# Patient Record
Sex: FEMALE | Race: WHITE | NOT HISPANIC OR LATINO | Employment: UNEMPLOYED | URBAN - NONMETROPOLITAN AREA
[De-identification: names, ages, dates, MRNs, and addresses within clinical notes are randomized per-mention and may not be internally consistent; named-entity substitution may affect disease eponyms.]

---
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
## 2021-01-19 ENCOUNTER — TELEPHONE (OUTPATIENT)
Dept: OBSTETRICS AND GYNECOLOGY | Facility: CLINIC | Age: 21
End: 2021-01-19

## 2021-01-19 NOTE — TELEPHONE ENCOUNTER
I tried to call patient to remind her of her appointment tomorrow. She did not answer. I could not leave a message.

## 2021-01-20 ENCOUNTER — INITIAL PRENATAL (OUTPATIENT)
Dept: OBSTETRICS AND GYNECOLOGY | Facility: CLINIC | Age: 21
End: 2021-01-20

## 2021-01-20 ENCOUNTER — LAB (OUTPATIENT)
Dept: LAB | Facility: HOSPITAL | Age: 21
End: 2021-01-20

## 2021-01-20 VITALS — SYSTOLIC BLOOD PRESSURE: 112 MMHG | WEIGHT: 216 LBS | BODY MASS INDEX: 34.86 KG/M2 | DIASTOLIC BLOOD PRESSURE: 62 MMHG

## 2021-01-20 DIAGNOSIS — Z34.91 PREGNANCY WITH UNCERTAIN DATE OF LAST MENSTRUAL PERIOD IN FIRST TRIMESTER, ANTEPARTUM: Primary | ICD-10-CM

## 2021-01-20 DIAGNOSIS — Z98.891 HISTORY OF C-SECTION: ICD-10-CM

## 2021-01-20 DIAGNOSIS — Z32.00 PREGNANCY EXAMINATION OR TEST, PREGNANCY UNCONFIRMED: ICD-10-CM

## 2021-01-20 DIAGNOSIS — Z34.80 SUPERVISION OF OTHER NORMAL PREGNANCY: Primary | ICD-10-CM

## 2021-01-20 DIAGNOSIS — E66.9 OBESITY (BMI 30-39.9): ICD-10-CM

## 2021-01-20 DIAGNOSIS — F12.91 HISTORY OF MARIJUANA USE: ICD-10-CM

## 2021-01-20 LAB
ABO GROUP BLD: NORMAL
AMPHET+METHAMPHET UR QL: NEGATIVE
AMPHETAMINES UR QL: NEGATIVE
B-HCG UR QL: POSITIVE
BARBITURATES UR QL SCN: NEGATIVE
BASOPHILS # BLD AUTO: 0.05 10*3/MM3 (ref 0–0.2)
BASOPHILS NFR BLD AUTO: 0.5 % (ref 0–1.5)
BENZODIAZ UR QL SCN: NEGATIVE
BILIRUB UR QL STRIP: NEGATIVE
BLD GP AB SCN SERPL QL: NEGATIVE
BUPRENORPHINE SERPL-MCNC: NEGATIVE NG/ML
CANNABINOIDS SERPL QL: NEGATIVE
CLARITY UR: CLEAR
COCAINE UR QL: NEGATIVE
COLOR UR: YELLOW
DEPRECATED RDW RBC AUTO: 44.5 FL (ref 37–54)
EOSINOPHIL # BLD AUTO: 0.32 10*3/MM3 (ref 0–0.4)
EOSINOPHIL NFR BLD AUTO: 3.1 % (ref 0.3–6.2)
ERYTHROCYTE [DISTWIDTH] IN BLOOD BY AUTOMATED COUNT: 14.2 % (ref 12.3–15.4)
GLUCOSE UR STRIP-MCNC: NEGATIVE MG/DL
HBV SURFACE AG SERPL QL IA: NORMAL
HCT VFR BLD AUTO: 35.3 % (ref 34–46.6)
HCV AB SER DONR QL: NORMAL
HGB BLD-MCNC: 11.6 G/DL (ref 12–15.9)
HGB UR QL STRIP.AUTO: NEGATIVE
HIV1+2 AB SER QL: NORMAL
IMM GRANULOCYTES # BLD AUTO: 0.04 10*3/MM3 (ref 0–0.05)
IMM GRANULOCYTES NFR BLD AUTO: 0.4 % (ref 0–0.5)
INTERNAL NEGATIVE CONTROL: NEGATIVE
INTERNAL POSITIVE CONTROL: POSITIVE
KETONES UR QL STRIP: NEGATIVE
LEUKOCYTE ESTERASE UR QL STRIP.AUTO: ABNORMAL
LYMPHOCYTES # BLD AUTO: 2.16 10*3/MM3 (ref 0.7–3.1)
LYMPHOCYTES NFR BLD AUTO: 20.8 % (ref 19.6–45.3)
Lab: ABNORMAL
Lab: NORMAL
MCH RBC QN AUTO: 28.4 PG (ref 26.6–33)
MCHC RBC AUTO-ENTMCNC: 32.9 G/DL (ref 31.5–35.7)
MCV RBC AUTO: 86.5 FL (ref 79–97)
METHADONE UR QL SCN: NEGATIVE
MONOCYTES # BLD AUTO: 0.83 10*3/MM3 (ref 0.1–0.9)
MONOCYTES NFR BLD AUTO: 8 % (ref 5–12)
NEUTROPHILS NFR BLD AUTO: 6.98 10*3/MM3 (ref 1.7–7)
NEUTROPHILS NFR BLD AUTO: 67.2 % (ref 42.7–76)
NITRITE UR QL STRIP: NEGATIVE
NRBC BLD AUTO-RTO: 0 /100 WBC (ref 0–0.2)
OPIATES UR QL: NEGATIVE
OXYCODONE UR QL SCN: NEGATIVE
PCP UR QL SCN: NEGATIVE
PH UR STRIP.AUTO: 6 [PH] (ref 5–8)
PLATELET # BLD AUTO: 310 10*3/MM3 (ref 140–450)
PMV BLD AUTO: 11.4 FL (ref 6–12)
PROPOXYPH UR QL: NEGATIVE
PROT UR QL STRIP: NEGATIVE
RBC # BLD AUTO: 4.08 10*6/MM3 (ref 3.77–5.28)
RH BLD: POSITIVE
RPR SER QL: NORMAL
RUBV IGG SERPL IA-ACNC: POSITIVE
SP GR UR STRIP: 1.02 (ref 1–1.03)
TRICYCLICS UR QL SCN: NEGATIVE
UROBILINOGEN UR QL STRIP: ABNORMAL
WBC # BLD AUTO: 10.38 10*3/MM3 (ref 3.4–10.8)

## 2021-01-20 PROCEDURE — 80081 OBSTETRIC PANEL INC HIV TSTG: CPT | Performed by: OBSTETRICS & GYNECOLOGY

## 2021-01-20 PROCEDURE — 86803 HEPATITIS C AB TEST: CPT | Performed by: OBSTETRICS & GYNECOLOGY

## 2021-01-20 PROCEDURE — 36415 COLL VENOUS BLD VENIPUNCTURE: CPT

## 2021-01-20 PROCEDURE — 80306 DRUG TEST PRSMV INSTRMNT: CPT | Performed by: OBSTETRICS & GYNECOLOGY

## 2021-01-20 PROCEDURE — 81025 URINE PREGNANCY TEST: CPT | Performed by: OBSTETRICS & GYNECOLOGY

## 2021-01-20 PROCEDURE — 87491 CHLMYD TRACH DNA AMP PROBE: CPT | Performed by: OBSTETRICS & GYNECOLOGY

## 2021-01-20 PROCEDURE — 81003 URINALYSIS AUTO W/O SCOPE: CPT | Performed by: OBSTETRICS & GYNECOLOGY

## 2021-01-20 PROCEDURE — 99214 OFFICE O/P EST MOD 30 MIN: CPT | Performed by: OBSTETRICS & GYNECOLOGY

## 2021-01-20 PROCEDURE — 87086 URINE CULTURE/COLONY COUNT: CPT | Performed by: OBSTETRICS & GYNECOLOGY

## 2021-01-20 PROCEDURE — 87591 N.GONORRHOEAE DNA AMP PROB: CPT | Performed by: OBSTETRICS & GYNECOLOGY

## 2021-01-20 PROCEDURE — 87661 TRICHOMONAS VAGINALIS AMPLIF: CPT | Performed by: OBSTETRICS & GYNECOLOGY

## 2021-01-20 NOTE — PROGRESS NOTES
I spent approximately 45 minutes with the patient acquiring the health and history intake and discussing topics related to healthy lifestyle. Her LMP is approximately 10/31/20.  This is her 2nd pregnancy. She had a csection with her son. She was induced because of polyhydramnios and fetal macrosomia. She had fever and chorioamnionitis, so a csection was done. Then, she had a postpartum hemorrhage. She would like to  if possible. A pamphlet for  is given to the patient and she will discuss if she is able to have a  with the provider. A newob bag is given today. The 1st trimester teaching was done with the patient. We discussed a healthy diet and exercise and what is recommended. I also discussed Listeriosis and Toxoplasmosis and what fish to avoid due to high mercury levels. I informed patient not to be in hot tubs, saunas, or tanning beds. We discussed that spotting may occur after intercourse which is common, but if heavy bleeding like a period occurs to call [facility] or hospital if clinic is closed.  I encouraged her to make an appointment with the dentist if she has not had a dental exam and cleaning in the last 6 months. She admits she has not seen the dentist in about 2 years.  I instructed the patient that alcohol, illicit drug use, and tobacco smoking are not recommended in pregnancy.  She said she was smoking marijuana, but she says she quit. Information is given to the patient on drug use in pregnancy.  I encouraged her not to be around any secondhand smoke. She plans to breastfeed. She said she didn't get to breastfeed her son because he went to NICU after delivery.  I gave her pamphlet on breastfeeding classes and the breastfeeding mothers support group. These services are provided by Luana Mendoza, Lactation Consultant. She filled out the health department referral form and depression screening questionnaire. I encouraged the patient to get the TDAP vaccine in the 3rd trimester.  I  discussed with the patient that a pediatrician needs to be chosen prior to delivery for the infant to have an appointment scheduled before leaving the hospital. She says her son sees Dr. Deena Tam and plans to continue with her.  I discussed lab tests will be done today. An early 1hr glucola is ordered also. All questions were answered at this time. She is seeing Dr. Pimentel today.

## 2021-01-20 NOTE — PROGRESS NOTES
[facility]   Obstetrics Visit     CHIEF COMPLAINT:  New prenatal visit    HISTORY OF PRESENT ILLNESS:  Ramesh Lebron is a 20 y.o. y/o  at Unknown by LMP (Patient's last menstrual period was 10/31/2020.).  This was a  planned pregnancy and the patient is supported by  .  Reports   nausea with  vomiting.   Reports breast tenderness.  She denies any vaginal bleeding.  She has   started taking a prenatal vitamin.    REVIEW OF SYSTEMS  Review of Systems    PRENATAL RISK FACTORS   Problems (from 22 to present)     No problems associated with this episode.          DATING CRITERIA:  LMP uncertain       OBSTETRIC HISTORY:  OB History    Para Term  AB Living   2 1 1     1   SAB TAB Ectopic Molar Multiple Live Births           0 1      # Outcome Date GA Lbr Miguel/2nd Weight Sex Delivery Anes PTL Lv   2 Current            1 Term 20 39w6d  4260 g (9 lb 6.3 oz) M CS-LTranv EPI, Gen N ROSE      Complications: Failure to Progress in First Stage     GYN HISTORY:  Denies h/o sexually transmitted infections/pelvic inflammatory disease  Denies h/o abnormal pap smears  Last pap smear:   Last Completed Pap Smear     Patient has no health maintenance due at this time        Denies h/o gynecologic surgeries, including biopsies of the cervix    PAST MEDICAL HISTORY:  Past Medical History:   Diagnosis Date   • Chlamydia    • History of chorioamnionitis      PAST SURGICAL HISTORY:  Past Surgical History:   Procedure Laterality Date   •  SECTION N/A 2020    Procedure:  SECTION PRIMARY;  Surgeon: Evangelista Pimentel MD;  Location: [facility] LABOR DELIVERY;  Service: Obstetrics/Gynecology;  Laterality: N/A;     FAMILY HISTORY:  Family History   Problem Relation Age of Onset   • No Known Problems Father    • Lupus Mother    • Cancer Mother    • Fibromyalgia Mother    • No Known Problems Brother    • No Known Problems Maternal Grandmother    • No Known Problems  Maternal Grandfather    • Asthma Brother    • No Known Problems Son      SOCIAL HISTORY:  Social History     Socioeconomic History   • Marital status: Single     Spouse name: Not on file   • Number of children: Not on file   • Years of education: Not on file   • Highest education level: Not on file   Tobacco Use   • Smoking status: Former Smoker   • Smokeless tobacco: Never Used   Substance and Sexual Activity   • Alcohol use: Not Currently     Frequency: Never   • Drug use: Not Currently     Types: Marijuana   • Sexual activity: Yes     Partners: Male     GENETIC SCREENING:  Age >36 yo as of CHICHI: Not applicable  Thalassemia: Denies  NTD: Denies  CHD: None  Down Syndrome/MR/Fragile X/Autism: Denies  Ashkenazi Orthodox with Truman-Sachs, [place], familial dysautonomia: Denies  Sickle cell disease or trait: Denies  Hemophilia: Denies  Muscular dystrophy: Denies  Cystic fibrosis: Denies  [place]'s chorea: Denies  Birth defects: Denies  Genetic/chromosomal disorders: Denied    INFECTION HISTORY:  TB exposure: Denies  HSV: Denies  Illness since LMP: Denies  Prior GBS infected child: Denies  STIs: Denies    ALLERGIES:  Allergies   Allergen Reactions   • Adhesive Tape Rash       MEDICATIONS:  Prior to Admission medications    Medication Sig Start Date End Date Taking? Authorizing Provider   norethindrone-ethinyl estradiol (Ortho-Novum 1/35, 28,) 1-35 MG-MCG per tablet Take 1 tablet by mouth Daily. 10/8/20 1/20/21  Evangelista Pimentel MD       PHYSICAL EXAM:   LMP 10/31/2020   General: Alert, healthy, no distress, well nourished and well developed.  Neurologic: Alert, oriented to person, place, and time.  Gait normal.  Cranial nerves II-XII grossly intact.  HEENT: Normocephalic, atraumatic.  Extraocular muscles intact, pupils equal and reactive x2.    Teeth: Normal hygiene.  Neck: Supple, no adenopathy, thyroid normal size, non-tender, without nodularity, trachea midline.  Breasts: No masses, skin dimpling, skin retraction,  nipple discharge, or asymmetry bilaterally.  Lungs: Normal respiratory effort.  Clear to auscultation bilaterally.  No wheezes, rhonci, or rales.  Heart: Regular rate and rhythm.  No murmer, rub or gallop.  Abdomen: Soft, non-tender, non-distended,no masses, no hepatosplenomegaly, no hernia.  Skin: No rash, no lesions.  Extremities: No cyanosis, clubbing or edema.  PELVIC EXAM:  External Genitalia/Vulva: Anatomy is normal, no significant redness of labia, no discharge on vulvar tissues, [place]'s and Bartholin's glands are normal, no ulcers, no condylomatous lesions.  Urethra: Normal, no lesions.  Vagina: Vaginal tissues are not inflamed, normal color and texture, no significant discharge present.  Cervix: Normal in appearance without lesions or purulent discharge, no cervical motion tenderness.  Uterus: Normal size, shape, and consistency.  Adnexa: Normal size and shape bilaterally, no palpable mass bilaterally and non-tender bilaterally.            IMPRESSION:  Ramesh Lebron is a 20 y.o.  at Unknown for a new prenatal visit.    PLAN:  1.  IUP at uncertain LMP  - Options counseling performed and patient desires continuation of pregnancy to term   - Prenatal labs ordered  - Genetic testing, including cystic fibrosis, was discussed and patient wants cell free DNA  - Continue  prenatal vitamins  - Weight gain counseling performed.   - Pregravid BMI >30: Recommend 11-20 lb  - Return to clinic in 4 weeks for return prenatal visit  - Reviewed COVID-19 visitation policy  - Reviewed COVID-19 precautions  For uterine pregnancy with uncertain last menstrual period will obtain an ultrasound for evaluation and will obtain prenatal labs.  Reviewed care everywhere records from [facility]  No diagnosis found.  Evangelista Pimentel MD  2021  15:09 CST

## 2021-01-21 LAB
BACTERIA SPEC AEROBE CULT: ABNORMAL
C TRACH RRNA CVX QL NAA+PROBE: POSITIVE
N GONORRHOEA RRNA SPEC QL NAA+PROBE: NEGATIVE
TRICHOMONAS VAGINALIS PCR: NEGATIVE

## 2021-01-22 ENCOUNTER — HOSPITAL ENCOUNTER (OUTPATIENT)
Dept: ULTRASOUND IMAGING | Facility: HOSPITAL | Age: 21
Discharge: HOME | End: 2021-01-22
Admitting: OBSTETRICS & GYNECOLOGY

## 2021-01-22 ENCOUNTER — HOSPITAL ENCOUNTER (EMERGENCY)
Facility: HOSPITAL | Age: 21
Discharge: STILL ADMITTED OR ADMINISTRATIVE | End: 2021-01-22

## 2021-01-22 ENCOUNTER — ROUTINE PRENATAL (OUTPATIENT)
Dept: OBSTETRICS AND GYNECOLOGY | Facility: CLINIC | Age: 21
End: 2021-01-22

## 2021-01-22 VITALS — WEIGHT: 219 LBS | SYSTOLIC BLOOD PRESSURE: 114 MMHG | BODY MASS INDEX: 35.35 KG/M2 | DIASTOLIC BLOOD PRESSURE: 70 MMHG

## 2021-01-22 DIAGNOSIS — Z34.91 PREGNANCY WITH UNCERTAIN DATE OF LAST MENSTRUAL PERIOD IN FIRST TRIMESTER, ANTEPARTUM: ICD-10-CM

## 2021-01-22 DIAGNOSIS — A74.9 CHLAMYDIA INFECTION DURING PREGNANCY: Primary | ICD-10-CM

## 2021-01-22 DIAGNOSIS — Z3A.12 12 WEEKS GESTATION OF PREGNANCY: ICD-10-CM

## 2021-01-22 DIAGNOSIS — Z98.891 HISTORY OF CESAREAN SECTION: ICD-10-CM

## 2021-01-22 DIAGNOSIS — O98.819 CHLAMYDIA INFECTION DURING PREGNANCY: Primary | ICD-10-CM

## 2021-01-22 PROCEDURE — 99213 OFFICE O/P EST LOW 20 MIN: CPT | Performed by: OBSTETRICS & GYNECOLOGY

## 2021-01-22 PROCEDURE — 76817 TRANSVAGINAL US OBSTETRIC: CPT

## 2021-01-22 RX ORDER — AZITHROMYCIN 500 MG/1
1000 TABLET, FILM COATED ORAL ONCE
Qty: 2 TABLET | Refills: 1 | Status: SHIPPED | OUTPATIENT
Start: 2021-01-22 | End: 2021-01-22

## 2021-01-22 RX ORDER — MULTIPLE VITAMINS W/ MINERALS TAB 9MG-400MCG
1 TAB ORAL DAILY
COMMUNITY
End: 2022-05-20 | Stop reason: HOSPADM

## 2021-01-22 NOTE — PROGRESS NOTES
CC: Prenatal visit    Ramesh Lebron is a 20 y.o.  at 12w0d.  Doing well.  Denies contractions, LOF, or VB.  Reports good FM.    /70   Wt 99.3 kg (219 lb)   BMI 35.35 kg/m²   SVE: Not done           Problems (from 21 to present)     Problem Noted Resolved    History of  section 2021 by Evangelista Pimentel MD No    Priority:  High      Chlamydia infection during pregnancy 12/10/2019 by Rosario Rowe APRN No    Priority:  High      Overview Signed 2021  5:00 PM by Evangelista Pimentel MD     Patient informed 2021 prescription given the importance of her and her partner being treated importance of test of cure               A/P: Ramesh Lebron is a 20 y.o.  at 12w0d.  - RTC in 2 weeks  - Reviewed COVID-19 visitation policy  - Reviewed COVID-19 precautions     Diagnosis Plan   1. Chlamydia infection during pregnancy      Treated 2021 importance of treatment for her baby reviewed importance of having partner treated reviewed.     2. History of  section   initial [place] counseling done   3. 12 weeks gestation of pregnancy     Initial prenatal lab work reviewed with patient.  Urine C&S positive gram-positive phylicia I do not think it represents a pathogen.  To consider repeat.  Chlamydia positive as above.  Reviewed in care everywhere last [facility] as visit no relevancy found  Evangelista Pimentel MD  2021  17:06 CST

## 2021-03-01 ENCOUNTER — LAB (OUTPATIENT)
Dept: LAB | Facility: HOSPITAL | Age: 21
End: 2021-03-01

## 2021-03-01 ENCOUNTER — ROUTINE PRENATAL (OUTPATIENT)
Dept: OBSTETRICS AND GYNECOLOGY | Facility: CLINIC | Age: 21
End: 2021-03-01

## 2021-03-01 VITALS — WEIGHT: 221.8 LBS | SYSTOLIC BLOOD PRESSURE: 108 MMHG | DIASTOLIC BLOOD PRESSURE: 62 MMHG | BODY MASS INDEX: 35.8 KG/M2

## 2021-03-01 DIAGNOSIS — Z98.891 HISTORY OF CESAREAN SECTION: ICD-10-CM

## 2021-03-01 DIAGNOSIS — Z3A.17 17 WEEKS GESTATION OF PREGNANCY: ICD-10-CM

## 2021-03-01 DIAGNOSIS — Z34.80 SUPERVISION OF OTHER NORMAL PREGNANCY: ICD-10-CM

## 2021-03-01 DIAGNOSIS — E66.9 OBESITY (BMI 30-39.9): ICD-10-CM

## 2021-03-01 DIAGNOSIS — A74.9 CHLAMYDIA INFECTION DURING PREGNANCY: ICD-10-CM

## 2021-03-01 DIAGNOSIS — A74.9 CHLAMYDIA INFECTION DURING PREGNANCY: Primary | ICD-10-CM

## 2021-03-01 DIAGNOSIS — Z36.3 ENCOUNTER FOR ROUTINE SCREENING FOR FETAL MALFORMATION USING ULTRASOUND: ICD-10-CM

## 2021-03-01 DIAGNOSIS — O98.819 CHLAMYDIA INFECTION DURING PREGNANCY: ICD-10-CM

## 2021-03-01 DIAGNOSIS — Z98.891 HISTORY OF C-SECTION: ICD-10-CM

## 2021-03-01 DIAGNOSIS — O98.819 CHLAMYDIA INFECTION DURING PREGNANCY: Primary | ICD-10-CM

## 2021-03-01 LAB — GLUCOSE 1H P 100 G GLC PO SERPL-MCNC: 92 MG/DL (ref 60–140)

## 2021-03-01 PROCEDURE — 87591 N.GONORRHOEAE DNA AMP PROB: CPT

## 2021-03-01 PROCEDURE — 82950 GLUCOSE TEST: CPT

## 2021-03-01 PROCEDURE — 99213 OFFICE O/P EST LOW 20 MIN: CPT | Performed by: OBSTETRICS & GYNECOLOGY

## 2021-03-01 PROCEDURE — 87661 TRICHOMONAS VAGINALIS AMPLIF: CPT

## 2021-03-01 PROCEDURE — 36415 COLL VENOUS BLD VENIPUNCTURE: CPT

## 2021-03-01 PROCEDURE — 87491 CHLMYD TRACH DNA AMP PROBE: CPT

## 2021-03-01 NOTE — PROGRESS NOTES
CC: Prenatal visit    Ramesh Lebron is a 20 y.o.  at 17w3d.  Doing well.  Denies contractions, LOF, or VB.  Reports good FM.    /62   Wt 101 kg (221 lb 12.8 oz)   BMI 35.80 kg/m²   SVE: Not done     Fetal Heart Rate: 150     Problems (from 21 to present)     Problem Noted Resolved    History of  section 2021 by Evangelista Pimentel MD No    Priority:  High      Chlamydia infection during pregnancy 12/10/2019 by Rosario Rowe APRN No    Priority:  High      Overview Signed 2021  5:00 PM by Evangelista Pimentel MD     Patient informed 2021 prescription given the importance of her and her partner being treated importance of test of cure               A/P: Ramesh Lebron is a 20 y.o.  at 17w3d.  - RTC in 2 weeks  - Reviewed COVID-19 visitation policy  - Reviewed COVID-19 precautions     Diagnosis Plan   1. Chlamydia infection during pregnancy  CT/NG, TV, PCR - Urine, Urine, Clean Catch   2. History of  section     3. Encounter for routine screening for fetal malformation using ultrasound  MFM OB US MAD   4. 17 weeks gestation of pregnancy     Records reviewed.  Positive chlamydia earlier in pregnancy says she and partner treated.  Test of cure ordered today via urine specimen per [facility] policy  Evangelista Pimentel MD  3/1/2021  13:26 CST

## 2021-03-03 ENCOUNTER — TELEPHONE (OUTPATIENT)
Dept: OBSTETRICS AND GYNECOLOGY | Facility: CLINIC | Age: 21
End: 2021-03-03

## 2021-03-22 ENCOUNTER — ROUTINE PRENATAL (OUTPATIENT)
Dept: OBSTETRICS AND GYNECOLOGY | Facility: CLINIC | Age: 21
End: 2021-03-22

## 2021-03-22 VITALS — BODY MASS INDEX: 36.67 KG/M2 | WEIGHT: 227.2 LBS | DIASTOLIC BLOOD PRESSURE: 62 MMHG | SYSTOLIC BLOOD PRESSURE: 110 MMHG

## 2021-03-22 DIAGNOSIS — O98.819 CHLAMYDIA INFECTION DURING PREGNANCY: ICD-10-CM

## 2021-03-22 DIAGNOSIS — A74.9 CHLAMYDIA INFECTION DURING PREGNANCY: ICD-10-CM

## 2021-03-22 DIAGNOSIS — Z98.891 HISTORY OF CESAREAN SECTION: ICD-10-CM

## 2021-03-22 DIAGNOSIS — Z36.2 ANTENATAL SCREENING BASED ON AMNIOCENTESIS: ICD-10-CM

## 2021-03-22 DIAGNOSIS — Z3A.20 20 WEEKS GESTATION OF PREGNANCY: ICD-10-CM

## 2021-03-22 DIAGNOSIS — Z36.2 ENCOUNTER FOR OTHER ANTENATAL SCREENING FOLLOW-UP: Primary | ICD-10-CM

## 2021-03-22 PROCEDURE — 99213 OFFICE O/P EST LOW 20 MIN: CPT | Performed by: OBSTETRICS & GYNECOLOGY

## 2021-03-22 NOTE — PROGRESS NOTES
CC: Prenatal visit    Ramesh Lebron is a 20 y.o.  at 20w3d.  Doing well.  Denies contractions, LOF, or VB.  Reports good FM.    /62   Wt 103 kg (227 lb 3.2 oz)   BMI 36.67 kg/m²   SVE: Not done  Fundal Height (cm): 20 cm  Fetal Heart Rate: 133     Problems (from 21 to present)     Problem Noted Resolved    History of  section 2021 by Evangelista Pimentel MD No    Priority:  High      Chlamydia infection during pregnancy 12/10/2019 by Rosario Rowe APRN No    Priority:  High      Overview Signed 2021  5:00 PM by Evangelista Pimentel MD     Patient informed 2021 prescription given the importance of her and her partner being treated importance of test of cure             Anatomy scan done today images reviewed with sonographer.  There were suboptimal spine and umbilical cord will order repeat 4 weeks for the subop  A/P: Ramesh Lebron is a 20 y.o.  at 20w3d.  - RTC in 4 weeks  - Reviewed COVID-19 visitation policy  - Reviewed COVID-19 precautions     Diagnosis Plan   1. Encounter for other  screening follow-up  MFM OB US MAD   2. History of  section     3. Chlamydia infection during pregnancy     4. 20 weeks gestation of pregnancy     5.  screening based on amniocentesis       Evangelista Pimentel MD  3/22/2021  10:56 CDT

## 2021-03-27 ENCOUNTER — HOSPITAL ENCOUNTER (OUTPATIENT)
Facility: HOSPITAL | Age: 21
End: 2021-03-27
Attending: OBSTETRICS & GYNECOLOGY | Admitting: OBSTETRICS & GYNECOLOGY

## 2021-03-27 ENCOUNTER — HOSPITAL ENCOUNTER (OUTPATIENT)
Facility: HOSPITAL | Age: 21
Discharge: HOME | End: 2021-03-27
Attending: OBSTETRICS & GYNECOLOGY | Admitting: OBSTETRICS & GYNECOLOGY

## 2021-03-27 VITALS
SYSTOLIC BLOOD PRESSURE: 121 MMHG | TEMPERATURE: 98.3 F | OXYGEN SATURATION: 98 % | HEIGHT: 67 IN | WEIGHT: 227 LBS | RESPIRATION RATE: 16 BRPM | DIASTOLIC BLOOD PRESSURE: 58 MMHG | BODY MASS INDEX: 35.63 KG/M2 | HEART RATE: 77 BPM

## 2021-03-27 LAB
BACTERIA UR QL AUTO: ABNORMAL /HPF
BILIRUB UR QL STRIP: NEGATIVE
CANDIDA ALBICANS: NEGATIVE
CLARITY UR: ABNORMAL
COLOR UR: YELLOW
GARDNERELLA VAGINALIS: POSITIVE
GLUCOSE UR STRIP-MCNC: NEGATIVE MG/DL
HGB UR QL STRIP.AUTO: ABNORMAL
HYALINE CASTS UR QL AUTO: ABNORMAL /LPF
KETONES UR QL STRIP: NEGATIVE
LEUKOCYTE ESTERASE UR QL STRIP.AUTO: ABNORMAL
NITRITE UR QL STRIP: NEGATIVE
PH UR STRIP.AUTO: 7.5 [PH] (ref 5–9)
PROT UR QL STRIP: ABNORMAL
RBC # UR: ABNORMAL /HPF
REF LAB TEST METHOD: ABNORMAL
SP GR UR STRIP: 1.02 (ref 1–1.03)
SQUAMOUS #/AREA URNS HPF: ABNORMAL /HPF
T VAGINALIS DNA VAG QL PROBE+SIG AMP: NEGATIVE
UROBILINOGEN UR QL STRIP: ABNORMAL
WBC UR QL AUTO: ABNORMAL /HPF

## 2021-03-27 PROCEDURE — G0463 HOSPITAL OUTPT CLINIC VISIT: HCPCS

## 2021-03-27 PROCEDURE — 87510 GARDNER VAG DNA DIR PROBE: CPT | Performed by: OBSTETRICS & GYNECOLOGY

## 2021-03-27 PROCEDURE — 81001 URINALYSIS AUTO W/SCOPE: CPT | Performed by: OBSTETRICS & GYNECOLOGY

## 2021-03-27 PROCEDURE — 87660 TRICHOMONAS VAGIN DIR PROBE: CPT | Performed by: OBSTETRICS & GYNECOLOGY

## 2021-03-27 PROCEDURE — 87480 CANDIDA DNA DIR PROBE: CPT | Performed by: OBSTETRICS & GYNECOLOGY

## 2021-03-27 RX ORDER — METRONIDAZOLE 7.5 MG/G
GEL VAGINAL NIGHTLY
Qty: 70 G | Refills: 2 | Status: SHIPPED | OUTPATIENT
Start: 2021-03-27 | End: 2021-04-24

## 2021-03-27 RX ORDER — NITROFURANTOIN 25; 75 MG/1; MG/1
100 CAPSULE ORAL 2 TIMES DAILY
Qty: 10 CAPSULE | Refills: 0 | Status: SHIPPED | OUTPATIENT
Start: 2021-03-27 | End: 2021-04-24

## 2021-03-27 NOTE — DISCHARGE INSTR - ACTIVITY
Return or call provider for strong regular contractions which are 3-5 minutes apart after comfort measures.  Comfort measures include warm bath or shower, resting and drinking large glass of water.  Also return for leakage of fluids, vaginal bleeding or decreased fetal movements.  Drink plenty of water, keep next scheduled appointment and take all medications as prescribed.      new prescriptions at your pharmacy.

## 2021-03-27 NOTE — PROGRESS NOTES
Patient presented to labor and delivery work-up showed highly abnormal urinalysis with too numerous to and count WBCs leukocyte positive nitrite negative.  No upper tract symptoms    Diagnosis UTI pregnancy   We will treat with Macrodantin pending culture

## 2021-04-24 ENCOUNTER — HOSPITAL ENCOUNTER (EMERGENCY)
Facility: HOSPITAL | Age: 21
Discharge: HOME | End: 2021-04-24
Attending: EMERGENCY MEDICINE | Admitting: EMERGENCY MEDICINE

## 2021-04-24 VITALS
TEMPERATURE: 97.8 F | DIASTOLIC BLOOD PRESSURE: 59 MMHG | HEART RATE: 92 BPM | OXYGEN SATURATION: 100 % | SYSTOLIC BLOOD PRESSURE: 128 MMHG | RESPIRATION RATE: 16 BRPM | WEIGHT: 235 LBS | BODY MASS INDEX: 36.88 KG/M2 | HEIGHT: 67 IN

## 2021-04-24 DIAGNOSIS — O23.42 URINARY TRACT INFECTION IN MOTHER DURING SECOND TRIMESTER OF PREGNANCY: Primary | ICD-10-CM

## 2021-04-24 LAB
ANION GAP SERPL CALCULATED.3IONS-SCNC: 8 MMOL/L (ref 5–15)
BACTERIA UR QL AUTO: ABNORMAL /HPF
BASOPHILS # BLD AUTO: 0.03 10*3/MM3 (ref 0–0.2)
BASOPHILS NFR BLD AUTO: 0.3 % (ref 0–1.5)
BILIRUB UR QL STRIP: NEGATIVE
BUN SERPL-MCNC: 5 MG/DL (ref 6–20)
BUN/CREAT SERPL: 7.4 (ref 7–25)
CALCIUM SPEC-SCNC: 8.5 MG/DL (ref 8.6–10.5)
CHLORIDE SERPL-SCNC: 102 MMOL/L (ref 98–107)
CLARITY UR: ABNORMAL
CO2 SERPL-SCNC: 26 MMOL/L (ref 22–29)
COLOR UR: YELLOW
CREAT SERPL-MCNC: 0.68 MG/DL (ref 0.57–1)
DEPRECATED RDW RBC AUTO: 42.1 FL (ref 37–54)
EOSINOPHIL # BLD AUTO: 0.08 10*3/MM3 (ref 0–0.4)
EOSINOPHIL NFR BLD AUTO: 0.7 % (ref 0.3–6.2)
ERYTHROCYTE [DISTWIDTH] IN BLOOD BY AUTOMATED COUNT: 13.2 % (ref 12.3–15.4)
GFR SERPL CREATININE-BSD FRML MDRD: 111 ML/MIN/1.73
GLUCOSE SERPL-MCNC: 78 MG/DL (ref 65–99)
GLUCOSE UR STRIP-MCNC: NEGATIVE MG/DL
HCT VFR BLD AUTO: 30.7 % (ref 34–46.6)
HGB BLD-MCNC: 10.4 G/DL (ref 12–15.9)
HGB UR QL STRIP.AUTO: NEGATIVE
HOLD SPECIMEN: NORMAL
HYALINE CASTS UR QL AUTO: ABNORMAL /LPF
IMM GRANULOCYTES # BLD AUTO: 0.05 10*3/MM3 (ref 0–0.05)
IMM GRANULOCYTES NFR BLD AUTO: 0.4 % (ref 0–0.5)
KETONES UR QL STRIP: NEGATIVE
LEUKOCYTE ESTERASE UR QL STRIP.AUTO: ABNORMAL
LYMPHOCYTES # BLD AUTO: 1.28 10*3/MM3 (ref 0.7–3.1)
LYMPHOCYTES NFR BLD AUTO: 11.1 % (ref 19.6–45.3)
MCH RBC QN AUTO: 29.7 PG (ref 26.6–33)
MCHC RBC AUTO-ENTMCNC: 33.9 G/DL (ref 31.5–35.7)
MCV RBC AUTO: 87.7 FL (ref 79–97)
MONOCYTES # BLD AUTO: 1.13 10*3/MM3 (ref 0.1–0.9)
MONOCYTES NFR BLD AUTO: 9.8 % (ref 5–12)
NEUTROPHILS NFR BLD AUTO: 77.7 % (ref 42.7–76)
NEUTROPHILS NFR BLD AUTO: 8.98 10*3/MM3 (ref 1.7–7)
NITRITE UR QL STRIP: NEGATIVE
NRBC BLD AUTO-RTO: 0 /100 WBC (ref 0–0.2)
PH UR STRIP.AUTO: 7 [PH] (ref 5–9)
PLATELET # BLD AUTO: 208 10*3/MM3 (ref 140–450)
PMV BLD AUTO: 10.5 FL (ref 6–12)
POTASSIUM SERPL-SCNC: 3.5 MMOL/L (ref 3.5–5.2)
PROT UR QL STRIP: NEGATIVE
RBC # BLD AUTO: 3.5 10*6/MM3 (ref 3.77–5.28)
RBC # UR: ABNORMAL /HPF
REF LAB TEST METHOD: ABNORMAL
SODIUM SERPL-SCNC: 136 MMOL/L (ref 136–145)
SP GR UR STRIP: 1.01 (ref 1–1.03)
SQUAMOUS #/AREA URNS HPF: ABNORMAL /HPF
UROBILINOGEN UR QL STRIP: ABNORMAL
WBC # BLD AUTO: 11.55 10*3/MM3 (ref 3.4–10.8)
WBC UR QL AUTO: ABNORMAL /HPF
WHOLE BLOOD HOLD SPECIMEN: NORMAL

## 2021-04-24 PROCEDURE — 81001 URINALYSIS AUTO W/SCOPE: CPT | Performed by: EMERGENCY MEDICINE

## 2021-04-24 PROCEDURE — 85025 COMPLETE CBC W/AUTO DIFF WBC: CPT | Performed by: EMERGENCY MEDICINE

## 2021-04-24 PROCEDURE — 25010000002 CEFTRIAXONE: Performed by: EMERGENCY MEDICINE

## 2021-04-24 PROCEDURE — 96365 THER/PROPH/DIAG IV INF INIT: CPT

## 2021-04-24 PROCEDURE — 80048 BASIC METABOLIC PNL TOTAL CA: CPT | Performed by: EMERGENCY MEDICINE

## 2021-04-24 PROCEDURE — 99283 EMERGENCY DEPT VISIT LOW MDM: CPT

## 2021-04-24 PROCEDURE — 87086 URINE CULTURE/COLONY COUNT: CPT | Performed by: EMERGENCY MEDICINE

## 2021-04-24 RX ORDER — SODIUM CHLORIDE 0.9 % (FLUSH) 0.9 %
10 SYRINGE (ML) INJECTION AS NEEDED
Status: DISCONTINUED | OUTPATIENT
Start: 2021-04-24 | End: 2021-04-24 | Stop reason: HOSPADM

## 2021-04-24 RX ORDER — AMOXICILLIN AND CLAVULANATE POTASSIUM 875; 125 MG/1; MG/1
1 TABLET, FILM COATED ORAL 2 TIMES DAILY
Qty: 14 TABLET | Refills: 0 | Status: SHIPPED | OUTPATIENT
Start: 2021-04-24 | End: 2021-05-01

## 2021-04-24 RX ADMIN — SODIUM CHLORIDE 1000 ML: 900 INJECTION, SOLUTION INTRAVENOUS at 20:56

## 2021-04-24 RX ADMIN — CEFTRIAXONE 1 G: 1 INJECTION, POWDER, FOR SOLUTION INTRAMUSCULAR; INTRAVENOUS at 20:56

## 2021-04-25 LAB — BACTERIA SPEC AEROBE CULT: NORMAL

## 2021-04-25 NOTE — DISCHARGE INSTRUCTIONS
Return ED fever, vomiting, flank pain, abdominal pain, dehydration, vaginal bleeding, contractions, loss of fluid, worse condition, any other concerns

## 2021-04-25 NOTE — ED PROVIDER NOTES
Subjective   19yo female  @ 25 1/7wks gestation presents ED c/o 1d hx generalized myalgias with vague lower back discomfort and urinary frequency.  ROS neg fever/chills/cough/chest pain/abd pain/dysuria/hematuria/vaginal bleeding/loss of fluid/contractions.  ROS (+) fetal movement.      History provided by:  Patient  Dysuria  Pain severity:  No pain  Chronicity:  New  Associated symptoms: no flank pain and no vaginal discharge        Review of Systems   Constitutional: Negative.    HENT: Negative.    Respiratory: Negative.    Cardiovascular: Negative.    Gastrointestinal: Negative.    Genitourinary: Positive for frequency. Negative for dysuria, flank pain, vaginal bleeding and vaginal discharge.   Musculoskeletal: Positive for back pain.   Skin: Negative.    All other systems reviewed and are negative.      Past Medical History:   Diagnosis Date   • Chlamydia    • History of chorioamnionitis        Allergies   Allergen Reactions   • Adhesive Tape Rash       Past Surgical History:   Procedure Laterality Date   •  SECTION N/A 2020    Procedure:  SECTION PRIMARY;  Surgeon: Evangelista Pimentel MD;  Location: [facility] LABOR DELIVERY;  Service: Obstetrics/Gynecology;  Laterality: N/A;       Family History   Problem Relation Age of Onset   • No Known Problems Father    • Lupus Mother    • Cancer Mother    • Fibromyalgia Mother    • No Known Problems Brother    • No Known Problems Maternal Grandmother    • No Known Problems Maternal Grandfather    • Asthma Brother    • No Known Problems Son        Social History     Socioeconomic History   • Marital status: Single     Spouse name: Not on file   • Number of children: Not on file   • Years of education: Not on file   • Highest education level: Not on file   Tobacco Use   • Smoking status: Former Smoker   • Smokeless tobacco: Never Used   Substance and Sexual Activity   • Alcohol use: Not Currently   • Drug use: Not Currently     Types: Marijuana   •  Sexual activity: Yes     Partners: Male           Objective   Physical Exam  Vitals and nursing note reviewed.   Constitutional:       Appearance: Normal appearance.   HENT:      Head: Normocephalic and atraumatic.      Mouth/Throat:      Mouth: Mucous membranes are moist.   Eyes:      Pupils: Pupils are equal, round, and reactive to light.   Cardiovascular:      Rate and Rhythm: Normal rate and regular rhythm.      Pulses: Normal pulses.      Heart sounds: Normal heart sounds.   Pulmonary:      Effort: Pulmonary effort is normal. No respiratory distress.      Breath sounds: Normal breath sounds. No wheezing, rhonchi or rales.   Abdominal:      General: Abdomen is flat. Bowel sounds are normal. There is no distension.      Palpations: Abdomen is soft.      Tenderness: There is no abdominal tenderness. There is right CVA tenderness. There is no left CVA tenderness, guarding or rebound.   Musculoskeletal:         General: No swelling or deformity.      Cervical back: Normal range of motion and neck supple. No rigidity.   Lymphadenopathy:      Cervical: No cervical adenopathy.   Skin:     General: Skin is warm and dry.   Neurological:      Mental Status: She is alert.      GCS: GCS eye subscore is 4. GCS verbal subscore is 5. GCS motor subscore is 6.         Procedures           ED Course      Labs Reviewed   URINALYSIS W/ MICROSCOPIC IF INDICATED (NO CULTURE) - Abnormal; Notable for the following components:       Result Value    Appearance, UA Cloudy (*)     Leuk Esterase, UA Large (3+) (*)     All other components within normal limits   URINALYSIS, MICROSCOPIC ONLY - Abnormal; Notable for the following components:    WBC, UA 13-20 (*)     Bacteria, UA 1+ (*)     Squamous Epithelial Cells, UA 6-12 (*)     All other components within normal limits   BASIC METABOLIC PANEL - Abnormal; Notable for the following components:    BUN 5 (*)     Calcium 8.5 (*)     All other components within normal limits    Narrative:      GFR Normal >60  Chronic Kidney Disease <60  Kidney Failure <15     CBC WITH AUTO DIFFERENTIAL - Abnormal; Notable for the following components:    WBC 11.55 (*)     RBC 3.50 (*)     Hemoglobin 10.4 (*)     Hematocrit 30.7 (*)     Neutrophil % 77.7 (*)     Lymphocyte % 11.1 (*)     Neutrophils, Absolute 8.98 (*)     Monocytes, Absolute 1.13 (*)     All other components within normal limits   URINE CULTURE   CBC AND DIFFERENTIAL    Narrative:     The following orders were created for panel order CBC & Differential.  Procedure                               Abnormality         Status                     ---------                               -----------         ------                     CBC Auto Differential[[id number]]        Abnormal            Final result                 Please view results for these tests on the individual orders.   EXTRA TUBES    Narrative:     The following orders were created for panel order Extra Tubes.  Procedure                               Abnormality         Status                     ---------                               -----------         ------                     Light Blue Top[[id number]]                                   In process                 [facility][[id number]]                                   In process                   Please view results for these tests on the individual orders.   LIGHT BLUE TOP   GOLD TOP - [facility]    Final diagnoses:   Urinary tract infection in mother during second trimester of pregnancy       ED Disposition  ED Disposition     ED Disposition Condition Comment    Discharge Evangelista Coto MD  [address] [place], [place] [id number] [phone]    In 2 days           Medication List      New Prescriptions    amoxicillin-clavulanate 875-125 MG per tablet  Commonly known as: AUGMENTIN  Take 1 tablet by mouth 2 (Two) Times a Day for 7 days.           Where to Get Your Medications       These medications were sent to [id number] - [place], [place] - [address] AT [address] [facility] [address] - [phone]  - [phone]   [address] [address]    Phone: [phone]   · amoxicillin-clavulanate 875-125 MG per tablet          Reynaldo Bull MD  04/24/21 [id number]

## 2021-04-25 NOTE — ED NOTES
Reports generalized body aches since yesterday. Patient reports urinary symptoms. Recently treated for UTI. Denies fevers. Reports being 6 months pregnant. CHICHI 8/6/21. Denies vaginal pain/discharge, pelvic/abdominal pain.      Trena Robles, RN  04/24/21 2024

## 2021-04-28 RX ORDER — NITROFURANTOIN 25; 75 MG/1; MG/1
100 CAPSULE ORAL 2 TIMES DAILY
Qty: 10 CAPSULE | Refills: 0 | Status: SHIPPED | OUTPATIENT
Start: 2021-04-28 | End: 2021-07-28

## 2021-05-05 ENCOUNTER — ROUTINE PRENATAL (OUTPATIENT)
Dept: OBSTETRICS AND GYNECOLOGY | Facility: CLINIC | Age: 21
End: 2021-05-05

## 2021-05-05 VITALS — BODY MASS INDEX: 36.71 KG/M2 | DIASTOLIC BLOOD PRESSURE: 82 MMHG | SYSTOLIC BLOOD PRESSURE: 120 MMHG | WEIGHT: 234.4 LBS

## 2021-05-05 DIAGNOSIS — Z87.59 HISTORY OF POLYHYDRAMNIOS: ICD-10-CM

## 2021-05-05 DIAGNOSIS — Z98.891 HISTORY OF CESAREAN SECTION: ICD-10-CM

## 2021-05-05 DIAGNOSIS — A74.9 CHLAMYDIA INFECTION DURING PREGNANCY: ICD-10-CM

## 2021-05-05 DIAGNOSIS — Z36.2 ENCOUNTER FOR OTHER ANTENATAL SCREENING FOLLOW-UP: Primary | ICD-10-CM

## 2021-05-05 DIAGNOSIS — O98.819 CHLAMYDIA INFECTION DURING PREGNANCY: ICD-10-CM

## 2021-05-05 PROCEDURE — 99213 OFFICE O/P EST LOW 20 MIN: CPT | Performed by: OBSTETRICS & GYNECOLOGY

## 2021-05-05 NOTE — PROGRESS NOTES
CC: Prenatal visit    Ramesh EVANGELISTA is a 20 y.o.  at 26w5d.  Doing well.  Denies contractions, LOF, or VB.  Reports good FM.    /82   Wt 106 kg (234 lb 6.4 oz)   BMI 36.71 kg/m²   SVE: Not done  Fundal Height (cm): 27 cm  Fetal Heart Rate: 141     Problems (from 21 to present)     Problem Noted Resolved    History of polyhydramnios 2021 by Evangelista Pimentel MD No    Priority:  High      History of  section 2021 by Evangelista Pimentel MD No    Priority:  High      Chlamydia infection during pregnancy 12/10/2019 by Rosario Rowe APRN No    Priority:  High      Overview Signed 2021  5:00 PM by Evangelista Pimentel MD     Patient informed 2021 prescription given the importance of her and her partner being treated importance of test of cure               A/P: Ramesh EVANGELISTA is a 20 y.o.  at 26w5d.  - RTC in 2 weeks  - Reviewed COVID-19 visitation policy  - Reviewed COVID-19 precautions     Diagnosis Plan   1. Encounter for other  screening follow-up  Glucose, Post 50 Gm Glucola   2. History of  section     3. Chlamydia infection during pregnancy     4. History of polyhydramnios     BMI 36.7 will check another Glucola.  Last Glucola had been 92 but had been early in pregnancy  Evangelista Pimentel MD  2021  17:03 CDT

## 2021-05-19 ENCOUNTER — ROUTINE PRENATAL (OUTPATIENT)
Dept: OBSTETRICS AND GYNECOLOGY | Facility: CLINIC | Age: 21
End: 2021-05-19

## 2021-05-19 VITALS — SYSTOLIC BLOOD PRESSURE: 124 MMHG | WEIGHT: 242 LBS | BODY MASS INDEX: 37.9 KG/M2 | DIASTOLIC BLOOD PRESSURE: 72 MMHG

## 2021-05-19 DIAGNOSIS — Z98.891 HISTORY OF CESAREAN SECTION: ICD-10-CM

## 2021-05-19 DIAGNOSIS — A74.9 CHLAMYDIA INFECTION DURING PREGNANCY: ICD-10-CM

## 2021-05-19 DIAGNOSIS — O98.819 CHLAMYDIA INFECTION DURING PREGNANCY: ICD-10-CM

## 2021-05-19 DIAGNOSIS — Z3A.28 28 WEEKS GESTATION OF PREGNANCY: Primary | ICD-10-CM

## 2021-05-19 DIAGNOSIS — Z87.59 HISTORY OF POLYHYDRAMNIOS: ICD-10-CM

## 2021-05-19 PROCEDURE — 99213 OFFICE O/P EST LOW 20 MIN: CPT | Performed by: OBSTETRICS & GYNECOLOGY

## 2021-05-21 PROBLEM — Z3A.28 28 WEEKS GESTATION OF PREGNANCY: Status: ACTIVE | Noted: 2021-05-21

## 2021-05-21 NOTE — PROGRESS NOTES
CC: Prenatal visit    Ramesh Lebron is a 20 y.o.  at 29w0d.  Doing well.  Denies contractions, LOF, or VB.  Reports good FM.    /72   Wt 110 kg (242 lb)   BMI 37.90 kg/m²   SVE: Not done           Problems (from 21 to present)     Problem Noted Resolved    History of polyhydramnios 2021 by Evangelista Pimentel MD No    Priority:  High      History of  section 2021 by Evangelista Pimentel MD No    Priority:  High      Chlamydia infection during pregnancy 12/10/2019 by Rosario Rowe APRN No    Priority:  High      Overview Signed 2021  5:00 PM by Evangelista Pimentel MD     Patient informed 2021 prescription given the importance of her and her partner being treated importance of test of cure               A/P: Ramesh Lebron is a 20 y.o.  at 29w0d.  - RTC in 1 weeks  - Reviewed COVID-19 visitation policy  - Reviewed COVID-19 precautions     Diagnosis Plan   1. 28 weeks gestation of pregnancy     2. History of polyhydramnios     3. History of  section   [place] counseling done wants to have repeat  section risk benefits alternatives reviewed   4. Chlamydia infection during pregnancy   test of cure had been negative    Test of cure negative     Evangelista Pimentel MD  2021  18:35 CDT

## 2021-07-20 ENCOUNTER — LAB (OUTPATIENT)
Dept: LAB | Facility: HOSPITAL | Age: 21
End: 2021-07-20

## 2021-07-20 ENCOUNTER — ROUTINE PRENATAL (OUTPATIENT)
Dept: OBSTETRICS AND GYNECOLOGY | Facility: CLINIC | Age: 21
End: 2021-07-20

## 2021-07-20 VITALS — DIASTOLIC BLOOD PRESSURE: 60 MMHG | SYSTOLIC BLOOD PRESSURE: 112 MMHG | WEIGHT: 253 LBS | BODY MASS INDEX: 39.63 KG/M2

## 2021-07-20 DIAGNOSIS — O98.819 CHLAMYDIA INFECTION DURING PREGNANCY: ICD-10-CM

## 2021-07-20 DIAGNOSIS — O99.213 OBESITY AFFECTING PREGNANCY IN THIRD TRIMESTER: ICD-10-CM

## 2021-07-20 DIAGNOSIS — O09.33 INSUFFICIENT PRENATAL CARE IN THIRD TRIMESTER: ICD-10-CM

## 2021-07-20 DIAGNOSIS — A74.9 CHLAMYDIA INFECTION DURING PREGNANCY: ICD-10-CM

## 2021-07-20 DIAGNOSIS — O09.93 HIGH-RISK PREGNANCY IN THIRD TRIMESTER: Primary | ICD-10-CM

## 2021-07-20 DIAGNOSIS — Z3A.37 37 WEEKS GESTATION OF PREGNANCY: ICD-10-CM

## 2021-07-20 DIAGNOSIS — Z36.85 ANTENATAL SCREENING FOR STREPTOCOCCUS B: ICD-10-CM

## 2021-07-20 DIAGNOSIS — O09.613 YOUNG PRIMIGRAVIDA IN THIRD TRIMESTER: ICD-10-CM

## 2021-07-20 PROBLEM — Z3A.20 20 WEEKS GESTATION OF PREGNANCY: Status: RESOLVED | Noted: 2021-03-22 | Resolved: 2021-07-20

## 2021-07-20 PROBLEM — Z3A.28 28 WEEKS GESTATION OF PREGNANCY: Status: RESOLVED | Noted: 2021-05-21 | Resolved: 2021-07-20

## 2021-07-20 PROBLEM — O40.9XX0 POLYHYDRAMNIOS AFFECTING PREGNANCY: Status: RESOLVED | Noted: 2020-06-17 | Resolved: 2021-07-20

## 2021-07-20 PROBLEM — Z3A.12 12 WEEKS GESTATION OF PREGNANCY: Status: RESOLVED | Noted: 2021-01-22 | Resolved: 2021-07-20

## 2021-07-20 PROBLEM — Z30.432 ENCOUNTER FOR IUD REMOVAL: Status: RESOLVED | Noted: 2020-10-08 | Resolved: 2021-07-20

## 2021-07-20 PROBLEM — Z30.430 ENCOUNTER FOR IUD INSERTION: Status: RESOLVED | Noted: 2020-09-03 | Resolved: 2021-07-20

## 2021-07-20 LAB
AMPHET+METHAMPHET UR QL: NEGATIVE
AMPHETAMINES UR QL: NEGATIVE
BARBITURATES UR QL SCN: NEGATIVE
BASOPHILS # BLD AUTO: 0.05 10*3/MM3 (ref 0–0.2)
BASOPHILS NFR BLD AUTO: 0.4 % (ref 0–1.5)
BENZODIAZ UR QL SCN: NEGATIVE
BUPRENORPHINE SERPL-MCNC: NEGATIVE NG/ML
CANNABINOIDS SERPL QL: NEGATIVE
COCAINE UR QL: NEGATIVE
DEPRECATED RDW RBC AUTO: 41.7 FL (ref 37–54)
EOSINOPHIL # BLD AUTO: 0.18 10*3/MM3 (ref 0–0.4)
EOSINOPHIL NFR BLD AUTO: 1.5 % (ref 0.3–6.2)
ERYTHROCYTE [DISTWIDTH] IN BLOOD BY AUTOMATED COUNT: 14.3 % (ref 12.3–15.4)
HBA1C MFR BLD: 5.2 % (ref 4.8–5.6)
HCT VFR BLD AUTO: 30.6 % (ref 34–46.6)
HGB BLD-MCNC: 10.6 G/DL (ref 12–15.9)
IMM GRANULOCYTES # BLD AUTO: 0.1 10*3/MM3 (ref 0–0.05)
IMM GRANULOCYTES NFR BLD AUTO: 0.9 % (ref 0–0.5)
LYMPHOCYTES # BLD AUTO: 2.17 10*3/MM3 (ref 0.7–3.1)
LYMPHOCYTES NFR BLD AUTO: 18.6 % (ref 19.6–45.3)
MCH RBC QN AUTO: 28.6 PG (ref 26.6–33)
MCHC RBC AUTO-ENTMCNC: 34.6 G/DL (ref 31.5–35.7)
MCV RBC AUTO: 82.7 FL (ref 79–97)
METHADONE UR QL SCN: NEGATIVE
MONOCYTES # BLD AUTO: 0.7 10*3/MM3 (ref 0.1–0.9)
MONOCYTES NFR BLD AUTO: 6 % (ref 5–12)
NEUTROPHILS NFR BLD AUTO: 72.6 % (ref 42.7–76)
NEUTROPHILS NFR BLD AUTO: 8.46 10*3/MM3 (ref 1.7–7)
NRBC BLD AUTO-RTO: 0 /100 WBC (ref 0–0.2)
OPIATES UR QL: NEGATIVE
OXYCODONE UR QL SCN: NEGATIVE
PCP UR QL SCN: NEGATIVE
PLATELET # BLD AUTO: 194 10*3/MM3 (ref 140–450)
PMV BLD AUTO: 11.5 FL (ref 6–12)
PROPOXYPH UR QL: NEGATIVE
RBC # BLD AUTO: 3.7 10*6/MM3 (ref 3.77–5.28)
TRICYCLICS UR QL SCN: NEGATIVE
WBC # BLD AUTO: 11.66 10*3/MM3 (ref 3.4–10.8)

## 2021-07-20 PROCEDURE — 85025 COMPLETE CBC W/AUTO DIFF WBC: CPT | Performed by: NURSE PRACTITIONER

## 2021-07-20 PROCEDURE — 87591 N.GONORRHOEAE DNA AMP PROB: CPT | Performed by: NURSE PRACTITIONER

## 2021-07-20 PROCEDURE — 87491 CHLMYD TRACH DNA AMP PROBE: CPT | Performed by: NURSE PRACTITIONER

## 2021-07-20 PROCEDURE — 99214 OFFICE O/P EST MOD 30 MIN: CPT | Performed by: NURSE PRACTITIONER

## 2021-07-20 PROCEDURE — 87661 TRICHOMONAS VAGINALIS AMPLIF: CPT | Performed by: NURSE PRACTITIONER

## 2021-07-20 PROCEDURE — 80306 DRUG TEST PRSMV INSTRMNT: CPT | Performed by: NURSE PRACTITIONER

## 2021-07-20 PROCEDURE — 83036 HEMOGLOBIN GLYCOSYLATED A1C: CPT | Performed by: NURSE PRACTITIONER

## 2021-07-20 PROCEDURE — 36415 COLL VENOUS BLD VENIPUNCTURE: CPT | Performed by: NURSE PRACTITIONER

## 2021-07-20 PROCEDURE — 87653 STREP B DNA AMP PROBE: CPT | Performed by: NURSE PRACTITIONER

## 2021-07-20 NOTE — PROGRESS NOTES
CC: Prenatal visit    Ramesh Lebron is a 20 y.o.  at 37w4d.  Doing well.  Denies contractions, LOF, or VB.  Reports good FM. Has not been seen since 28 weeks; had lost her insurance for a month; now has medicaid. Plans to have a repeat .   /60   Wt 115 kg (253 lb)   BMI 39.63 kg/m²      SVE: Deferred  Fundal Height (cm): 37 cm  Fetal Heart Rate: 150   Vertex via scan     Problems (from 21 to present)     Problem Noted Resolved    History of polyhydramnios 2021 by Evangelista Pimentel MD No    History of  section 2021 by Evangelista Pimentel MD No    Chlamydia infection during pregnancy 12/10/2019 by Rosario Rowe APRN No    Overview Addendum 2021 12:08 PM by Rosenda Jaime APRN     MAO negative; repeat screening on          Previous Version          A/P: Ramesh Lebron is a 20 y.o.  at 37w4d.  - Counseled on labwork for CBC, A1C, UDS, and G/C. Counseling provided the the possibility of undiagnosed GDM as she did not complete third trimester glucola; discussed the risk to  to include hypoglycemia at birth and possible NICU stay. Will do BPP and growth scan in one week and have patient see Dr. Pimentel to schedule repeat .   - Labor and fetal kick count precautions  - RTC in 1 weeks  - Reviewed COVID-19 visitation policy  - Reviewed COVID-19 precautions     Diagnosis Plan   1. High-risk pregnancy in third trimester  CBC Auto Differential    Hemoglobin A1c    Urine Drug Screen - Urine, Clean Catch    US Ob Follow Up Transabdominal Approach    US Fetal Biophysical Profile;Without Non-Stress Testing   2.  screening for streptococcus B  Group B Strep (Molecular) - Swab, Vaginal/Rectum   3. Insufficient prenatal care in third trimester  CBC Auto Differential    Hemoglobin A1c    Urine Drug Screen - Urine, Clean Catch    Chlamydia trachomatis, Neisseria gonorrhoeae, Trichomonas vaginalis, PCR -  Urine, Urine, Clean Catch    US Ob Follow Up Transabdominal Approach    US Fetal Biophysical Profile;Without Non-Stress Testing   4. 37 weeks gestation of pregnancy     5. Chlamydia infection during pregnancy  Chlamydia trachomatis, Neisseria gonorrhoeae, Trichomonas vaginalis, PCR - Urine, Urine, Clean Catch   6. Young primigravida in third trimester     7. Obesity affecting pregnancy in third trimester       DIONICIO Chatman  7/20/2021  12:16 CDT

## 2021-07-21 LAB
C TRACH RRNA CVX QL NAA+PROBE: NEGATIVE
GROUP B STREP, DNA: NEGATIVE
N GONORRHOEA RRNA SPEC QL NAA+PROBE: NEGATIVE
TRICHOMONAS VAGINALIS PCR: NEGATIVE

## 2021-07-28 ENCOUNTER — ROUTINE PRENATAL (OUTPATIENT)
Dept: OBSTETRICS AND GYNECOLOGY | Facility: CLINIC | Age: 21
End: 2021-07-28

## 2021-07-28 VITALS — WEIGHT: 252.2 LBS | BODY MASS INDEX: 39.5 KG/M2

## 2021-07-28 DIAGNOSIS — Z87.59 HISTORY OF POLYHYDRAMNIOS: ICD-10-CM

## 2021-07-28 DIAGNOSIS — Z3A.38 38 WEEKS GESTATION OF PREGNANCY: Primary | ICD-10-CM

## 2021-07-28 DIAGNOSIS — O98.819 CHLAMYDIA INFECTION DURING PREGNANCY: ICD-10-CM

## 2021-07-28 DIAGNOSIS — Z98.891 HISTORY OF CESAREAN SECTION: ICD-10-CM

## 2021-07-28 DIAGNOSIS — A74.9 CHLAMYDIA INFECTION DURING PREGNANCY: ICD-10-CM

## 2021-07-28 PROCEDURE — 99213 OFFICE O/P EST LOW 20 MIN: CPT | Performed by: OBSTETRICS & GYNECOLOGY

## 2021-07-28 RX ORDER — MISOPROSTOL 100 UG/1
800 TABLET ORAL AS NEEDED
Status: CANCELLED | OUTPATIENT
Start: 2021-07-28

## 2021-07-28 RX ORDER — CARBOPROST TROMETHAMINE 250 UG/ML
250 INJECTION, SOLUTION INTRAMUSCULAR AS NEEDED
Status: CANCELLED | OUTPATIENT
Start: 2021-07-28

## 2021-07-28 RX ORDER — SODIUM CHLORIDE 0.9 % (FLUSH) 0.9 %
3 SYRINGE (ML) INJECTION EVERY 12 HOURS SCHEDULED
Status: CANCELLED | OUTPATIENT
Start: 2021-07-28

## 2021-07-28 RX ORDER — LIDOCAINE HYDROCHLORIDE 10 MG/ML
5 INJECTION, SOLUTION EPIDURAL; INFILTRATION; INTRACAUDAL; PERINEURAL AS NEEDED
Status: CANCELLED | OUTPATIENT
Start: 2021-07-28

## 2021-07-28 RX ORDER — SODIUM CHLORIDE 0.9 % (FLUSH) 0.9 %
3-10 SYRINGE (ML) INJECTION AS NEEDED
Status: CANCELLED | OUTPATIENT
Start: 2021-07-28

## 2021-07-28 RX ORDER — PROMETHAZINE HYDROCHLORIDE 25 MG/1
12.5 SUPPOSITORY RECTAL EVERY 6 HOURS PRN
Status: CANCELLED | OUTPATIENT
Start: 2021-07-28

## 2021-07-28 RX ORDER — PROMETHAZINE HYDROCHLORIDE 25 MG/1
12.5 TABLET ORAL EVERY 6 HOURS PRN
Status: CANCELLED | OUTPATIENT
Start: 2021-07-28

## 2021-07-28 RX ORDER — METHYLERGONOVINE MALEATE 0.2 MG/ML
200 INJECTION INTRAVENOUS ONCE AS NEEDED
Status: CANCELLED | OUTPATIENT
Start: 2021-07-28

## 2021-07-28 RX ORDER — OXYTOCIN 10 [USP'U]/ML
650 INJECTION, SOLUTION INTRAMUSCULAR; INTRAVENOUS ONCE
Status: CANCELLED | OUTPATIENT
Start: 2021-07-28

## 2021-07-28 RX ORDER — OXYTOCIN 10 [USP'U]/ML
85 INJECTION, SOLUTION INTRAMUSCULAR; INTRAVENOUS ONCE
Status: CANCELLED | OUTPATIENT
Start: 2021-07-28

## 2021-07-28 RX ORDER — ONDANSETRON 4 MG/1
4 TABLET, FILM COATED ORAL EVERY 6 HOURS PRN
Status: CANCELLED | OUTPATIENT
Start: 2021-07-28

## 2021-07-28 RX ORDER — ONDANSETRON 2 MG/ML
4 INJECTION INTRAMUSCULAR; INTRAVENOUS EVERY 6 HOURS PRN
Status: CANCELLED | OUTPATIENT
Start: 2021-07-28

## 2021-07-28 RX ORDER — TRISODIUM CITRATE DIHYDRATE AND CITRIC ACID MONOHYDRATE 500; 334 MG/5ML; MG/5ML
30 SOLUTION ORAL ONCE
Status: CANCELLED | OUTPATIENT
Start: 2021-07-28 | End: 2021-07-28

## 2021-07-28 NOTE — H&P
Obstetric History and Physical    Chief complaint: History of  section        Patient is a 20 y.o. female  currently at 38w5d, who presents with patient with history of  section extensive [place] counseling has been done on various occasion done again today.  Patient is certain she wants to have repeat  section unless she goes into active spontaneous labor if progresses well.  I reviewed with her at length the risk of repeat  section.  Both the short-term and long-term risks of  section are reviewed at length.  Short-term risks of bleeding, infection, problems with wound healing, damage to bowel, bladder or ureter.  Small, but real risk of maternal mortality is reviewed and understood.    Long-term risks include in future pregnancies accreta abruption, uterine rupture and stillbirth, among others might be ameliorated by a .  The patient and her family have been given opportunity to ask questions and these questions have been answered to their satisfaction.  Risk of [place] are reviewed risk benefits alternatives reviewed.    Her prenatal care is through [facility]     Obstetric History   # 1 - Date: 20, Sex: Male, Weight: 4260 g (9 lb 6.3 oz), GA: 39w6d, Delivery: , Low Transverse, Apgar1: 8, Apgar5: 9, Living: Living, Birth Comments: None    # 2 - Date: None, Sex: None, Weight: None, GA: None, Delivery: None, Apgar1: None, Apgar5: None, Living: None, Birth Comments: None       The following portions of the patients history were reviewed and updated as appropriate: current medications, allergies, past medical history, past surgical history, past family history, past social history and problem list .       Prenatal Information:  Prenatal Results     POC Urine Glucose/Protein     Test Value Reference Range Date Time    Urine Glucose        Urine Protein              Initial Prenatal Labs     Test Value Reference Range Date Time    Hemoglobin   11.6 g/dL 12.0 - 15.9 01/20/21 1452    Hematocrit  35.3 % 34.0 - 46.6 01/20/21 1452    Platelets  194 10*3/mm3 140 - 450 07/20/21 1128       208 10*3/mm3 140 - 450 04/24/21 2056       310 10*3/mm3 140 - 450 01/20/21 1452    Rubella IgG  Positive   01/20/21 1452    Hepatitis B SAg  Non-Reactive  Non-Reactive 01/20/21 1452    Hepatitis C Ab  Non-Reactive  Non-Reactive 01/20/21 1452    RPR  Non-Reactive  Non-Reactive 01/20/21 1452    ABO  O   01/20/21 1452    Rh  Positive   01/20/21 1452    Antibody Screen  Negative   01/20/21 1452    HIV  Non-Reactive  Non-Reactive 01/20/21 1452    Urine Culture  >100,000 CFU/mL Normal Urogenital Rhonda   04/24/21 2011       50,000 CFU/mL Mixed Gram Positive Rhonda   01/20/21 1452    Gonorrhea  Negative  Negative 07/20/21 1128       Negative  Negative 03/01/21 1358       Negative  Negative 01/20/21 1452    Chlamydia  Negative  Negative 07/20/21 1128       Negative  Negative 03/01/21 1358       Positive  Negative 01/20/21 1452    TSH              2nd and 3rd Trimester     Test Value Reference Range Date Time    Hemoglobin (repeated)  10.6 g/dL 12.0 - 15.9 07/20/21 1128       10.4 g/dL 12.0 - 15.9 04/24/21 2056    Hematocrit (repeated)  30.6 % 34.0 - 46.6 07/20/21 1128       30.7 % 34.0 - 46.6 04/24/21 2056    GCT  92 mg/dL 60 - 140 03/01/21 1213    Antibody Screen (repeated)        GTT Fasting        GTT 1 Hr        GTT 2 Hr        GTT 3 Hr        Group B Strep  Negative  Negative 07/20/21 1055          Drug Screening     Test Value Reference Range Date Time    Amphetamine Screen  Negative  Negative 07/20/21 1128       Negative  Negative 01/20/21 1452    Barbiturate Screen  Negative  Negative 07/20/21 1128       Negative  Negative 01/20/21 1452    Benzodiazepine Screen  Negative  Negative 07/20/21 1128       Negative  Negative 01/20/21 1452    Methadone Screen  Negative  Negative 07/20/21 1128       Negative  Negative 01/20/21 1452    Phencyclidine Screen  Negative  Negative 07/20/21  1128       Negative  Negative 01/20/21 1452    Opiates Screen  Negative  Negative 07/20/21 1128       Negative  Negative 01/20/21 1452    THC Screen  Negative  Negative 07/20/21 1128       Negative  Negative 01/20/21 1452    Cocaine Screen  Negative  Negative 07/20/21 1128       Negative  Negative 01/20/21 1452    Propoxyphene Screen  Negative  Negative 07/20/21 1128       Negative  Negative 01/20/21 1452    Buprenorphine Screen  Negative  Negative 07/20/21 1128       Negative  Negative 01/20/21 1452    Methamphetamine Screen  Negative  Negative 07/20/21 1128       Negative  Negative 01/20/21 1452    Oxycodone Screen  Negative  Negative 07/20/21 1128       Negative  Negative 01/20/21 1452    Tricyclic Antidepressants Screen  Negative  Negative 07/20/21 1128       Negative  Negative 01/20/21 1452          Other (Risk screening)     Test Value Reference Range Date Time    Varicella IgG        Parvovirus IgG        CMV IgG        Cystic Fibrosis        Hemoglobin electrophoresis        NIPT        MSAFP-4        AFP (for NTD only)              Legend    ^: Historical                      External Prenatal Results     Pregnancy Outside Results - Transcribed From Office Records - See Scanned Records For Details     Test Value Date Time    ABO  O  01/20/21 1452    Rh  Positive  01/20/21 1452    Antibody Screen  Negative  01/20/21 1452    Varicella IgG       Rubella  Positive  01/20/21 1452    Hgb  10.6 g/dL 07/20/21 1128       10.4 g/dL 04/24/21 2056       11.6 g/dL 01/20/21 1452    Hct  30.6 % 07/20/21 1128       30.7 % 04/24/21 2056       35.3 % 01/20/21 1452    Glucose Fasting GTT       Glucose Tolerance Test 1 hour       Glucose Tolerance Test 3 hour       Gonorrhea (discrete)  Negative  07/20/21 1128       Negative  03/01/21 1358       Negative  01/20/21 1452    Chlamydia (discrete)  Negative  07/20/21 1128       Negative  03/01/21 1358       Positive  01/20/21 1452    RPR  Non-Reactive  01/20/21 1452    VDRL        Syphilis Antibody       HBsAg  Non-Reactive  21 1452    Herpes Simplex Virus PCR       Herpes Simplex VIrus Culture       HIV  Non-Reactive  21 1452    Hep C RNA Quant PCR       Hep C Antibody  Non-Reactive  21 1452    AFP       Group B Strep  Negative  21 1055    GBS Susceptibility to Clindamycin       GBS Susceptibility to Erythromycin       Fetal Fibronectin       Genetic Testing, Maternal Blood             Drug Screening     Test Value Date Time    Urine Drug Screen       Amphetamine Screen  Negative  21 1128       Negative  21 1452    Barbiturate Screen  Negative  21 1128       Negative  21 1452    Benzodiazepine Screen  Negative  21 1128       Negative  21 1452    Methadone Screen  Negative  21 1128       Negative  21 1452    Phencyclidine Screen  Negative  21 1128       Negative  21 1452    Opiates Screen  Negative  21 1128       Negative  21 1452    THC Screen  Negative  21 1128       Negative  21 1452    Cocaine Screen       Propoxyphene Screen  Negative  21 1128       Negative  21 1452    Buprenorphine Screen  Negative  21 1128       Negative  21 1452    Methamphetamine Screen       Oxycodone Screen  Negative  21 1128       Negative  21 1452    Tricyclic Antidepressants Screen  Negative  21 1128       Negative  21 1452          Legend    ^: Historical                         Past OB History:     OB History    Para Term  AB Living   2 1 1 0 0 1   SAB TAB Ectopic Molar Multiple Live Births   0 0 0 0 0 1      # Outcome Date GA Lbr Miguel/2nd Weight Sex Delivery Anes PTL Lv   2 Current            1 Term 20 39w6d  4260 g (9 lb 6.3 oz) M CS-LTranv EPI, Gen N ROSE      Complications: Failure to Progress in First Stage      Name: STEFANO EVANGELISTA      Apgar1: 8  Apgar5: 9        ALLERGIES:     Allergies   Allergen Reactions   •  Adhesive Tape Rash        Home Medications:     Prior to Admission medications    Medication Sig Start Date End Date Taking? Authorizing Provider   multivitamin with minerals tablet tablet Take 1 tablet by mouth Daily.   Yes Provider, MD Binta   nitrofurantoin, macrocrystal-monohydrate, (Macrobid) 100 MG capsule Take 1 capsule by mouth 2 (Two) Times a Day. 21  Evangelista Pimentel MD       Past Medical History: Past Medical History:   Diagnosis Date   • Chlamydia    • History of chorioamnionitis       Past Surgical History Past Surgical History:   Procedure Laterality Date   •  SECTION N/A 2020    Procedure:  SECTION PRIMARY;  Surgeon: Evangelista Pimentel MD;  Location: [facility] LABOR DELIVERY;  Service: Obstetrics/Gynecology;  Laterality: N/A;   •  SECTION  2020      Family History: Family History   Problem Relation Age of Onset   • No Known Problems Father    • Lupus Mother    • Cancer Mother    • Fibromyalgia Mother    • No Known Problems Brother    • No Known Problems Maternal Grandmother    • No Known Problems Maternal Grandfather    • Asthma Brother    • No Known Problems Son       Social History:  reports that she has quit smoking. She has never used smokeless tobacco.   reports no history of alcohol use.   reports no history of drug use.        Review of Systems                                                                                                                      Constitutional: Denies night sweats    HENT: No hearing changes, denies ear pain    Eye: No eye pain; no foreign body in eye    Pulmonary: No hemoptysis    Cardiovascular: No claudication    GI: No hematemesis    Musculoskeletal: No arthralgias, no joint swelling    Endocrine: No polydipsia or polyuria    Hematologic: Denies any free bleeding    Psychiatric: Denies any delusions      Objective     Documented Vitals    21 1149   Weight: 114 kg (252 lb 3.2 oz)          OBGyn  Exam  Constitutional: Appears to be in no acute distress; Eyes: sclera normal; Endocrine system: thyroid palpate is normal; Pulmonary system: lungs clear; Cardiovascular system: heart regular rate and rhythm; Gastrointestinal system: abdomen soft nontender, active bowel sounds; Urologic system: CVA negative; Psychiatric: appropriate insight; Neurologic: gait within normal limits today cervix is closed and thick      Last Labs  Lab Results   Component Value Date    WBC 11.66 (H) 2021    RBC 3.70 (L) 2021    HGB 10.6 (L) 2021    HCT 30.6 (L) 2021    MCV 82.7 2021    MCH 28.6 2021    MCHC 34.6 2021    RDW 14.3 2021    RDWSD 41.7 2021    MPV 11.5 2021     2021        Lab Results   Component Value Date    GLUCOSE 78 2021    BUN 5 (L) 2021    CREATININE 0.68 2021     2021    K 3.5 2021     2021    CO2 26.0 2021    CALCIUM 8.5 (L) 2021    PROTEINTOT 5.2 (L) 2020    ALBUMIN 2.80 (L) 2020    ALT 5 2020    AST 28 2020    ALKPHOS 262 (H) 2020    BILITOT 0.5 2020    EGFRIFNONA 111 2021    GLOB 2.4 2020    AGRATIO 1.2 2020    BCR 7.4 2021    ANIONGAP 8.0 2021       No results found for: HCGQUAL      Assessment/Plan:  1. 20 y.o. [id number].  After reviewing risk benefits alternatives patient wants repeat  section on left she goes in spontaneous active labor and progressed well in which case she wants to a trial of labor.  I have gone over [facility] [place] form case she does go into spontaneous active labor    Ramesh Lebron and I have discussed pain goals for this hospitalization after reviewing her current clinical condition, medical history and prior pain experiences.  The goal is to keep her pain level 3.  To help achieve this, I plan to ALTE modal pain management.       This document has been  electronically signed by Evangelista Pimentel MD on July 28, 2021 18:14 CDT\    Please note that portions of this note were completed with a voice recognition program.

## 2021-07-28 NOTE — PROGRESS NOTES
CC: Prenatal visit    Ramesh Lebron is a 20 y.o.  at 38w5d.  Doing well.  Denies contractions, LOF, or VB.  Reports good FM.    Wt 114 kg (252 lb 3.2 oz)   BMI 39.50 kg/m²   SVE: Closed           Problems (from 21 to present)     Problem Noted Resolved    History of polyhydramnios 2021 by Evangelista Pimentel MD No    Priority:  High      History of  section 2021 by Evangelista Pimentel MD No    Priority:  High      Chlamydia infection during pregnancy 12/10/2019 by Rosario Rowe APRN No    Priority:  High      Overview Addendum 2021 12:08 PM by Rosenda Jaime APRN     MAO negative; repeat screening on          Previous Version          A/P: Ramesh Lebron is a 20 y.o.  at 38w5d.     - Reviewed COVID-19 visitation policy  - Reviewed COVID-19 precautions     Diagnosis Plan   1. 38 weeks gestation of pregnancy     2. History of polyhydramnios     3. History of  section   again went over tolac counseling at length with patient.  She wants to have a repeat  section this scheduled for 2021.  She says if she goes into active labor before then she may want to have a .   form reviewed and signed   4. Chlamydia infection during pregnancy       Evangelista Pimentel MD  2021  18:08 CDT

## 2021-08-04 ENCOUNTER — ANESTHESIA EVENT (OUTPATIENT)
Dept: LABOR AND DELIVERY | Facility: HOSPITAL | Age: 21
End: 2021-08-04

## 2021-08-04 ENCOUNTER — ANESTHESIA (OUTPATIENT)
Dept: LABOR AND DELIVERY | Facility: HOSPITAL | Age: 21
End: 2021-08-04

## 2021-08-04 ENCOUNTER — HOSPITAL ENCOUNTER (INPATIENT)
Facility: HOSPITAL | Age: 21
LOS: 2 days | Discharge: HOME | End: 2021-08-06
Attending: OBSTETRICS & GYNECOLOGY | Admitting: OBSTETRICS & GYNECOLOGY

## 2021-08-04 DIAGNOSIS — Z98.891 HISTORY OF CESAREAN SECTION: ICD-10-CM

## 2021-08-04 DIAGNOSIS — Z98.890 POSTOPERATIVE STATE: Primary | ICD-10-CM

## 2021-08-04 LAB
ABO GROUP BLD: NORMAL
AMPHET+METHAMPHET UR QL: NEGATIVE
AMPHETAMINES UR QL: NEGATIVE
ANION GAP SERPL CALCULATED.3IONS-SCNC: 11 MMOL/L (ref 5–15)
BARBITURATES UR QL SCN: NEGATIVE
BENZODIAZ UR QL SCN: NEGATIVE
BLD GP AB SCN SERPL QL: NEGATIVE
BUN SERPL-MCNC: 8 MG/DL (ref 6–20)
BUN/CREAT SERPL: 12.3 (ref 7–25)
BUPRENORPHINE SERPL-MCNC: NEGATIVE NG/ML
CALCIUM SPEC-SCNC: 8.6 MG/DL (ref 8.6–10.5)
CANNABINOIDS SERPL QL: NEGATIVE
CHLORIDE SERPL-SCNC: 102 MMOL/L (ref 98–107)
CO2 SERPL-SCNC: 23 MMOL/L (ref 22–29)
COCAINE UR QL: NEGATIVE
CREAT SERPL-MCNC: 0.65 MG/DL (ref 0.57–1)
DEPRECATED RDW RBC AUTO: 44.7 FL (ref 37–54)
ERYTHROCYTE [DISTWIDTH] IN BLOOD BY AUTOMATED COUNT: 15.4 % (ref 12.3–15.4)
GFR SERPL CREATININE-BSD FRML MDRD: 117 ML/MIN/1.73
GLUCOSE SERPL-MCNC: 83 MG/DL (ref 65–99)
HCT VFR BLD AUTO: 30.6 % (ref 34–46.6)
HCT VFR BLD AUTO: 31.5 % (ref 34–46.6)
HGB BLD-MCNC: 10.3 G/DL (ref 12–15.9)
HGB BLD-MCNC: 10.7 G/DL (ref 12–15.9)
Lab: NORMAL
MCH RBC QN AUTO: 28.2 PG (ref 26.6–33)
MCHC RBC AUTO-ENTMCNC: 34 G/DL (ref 31.5–35.7)
MCV RBC AUTO: 82.9 FL (ref 79–97)
METHADONE UR QL SCN: NEGATIVE
OPIATES UR QL: NEGATIVE
OXYCODONE UR QL SCN: NEGATIVE
PCP UR QL SCN: NEGATIVE
PLATELET # BLD AUTO: 193 10*3/MM3 (ref 140–450)
PMV BLD AUTO: 10.8 FL (ref 6–12)
POTASSIUM SERPL-SCNC: 3.9 MMOL/L (ref 3.5–5.2)
PROPOXYPH UR QL: NEGATIVE
RBC # BLD AUTO: 3.8 10*6/MM3 (ref 3.77–5.28)
RH BLD: POSITIVE
SODIUM SERPL-SCNC: 136 MMOL/L (ref 136–145)
T&S EXPIRATION DATE: NORMAL
TRICYCLICS UR QL SCN: NEGATIVE
WBC # BLD AUTO: 11.86 10*3/MM3 (ref 3.4–10.8)

## 2021-08-04 PROCEDURE — 85014 HEMATOCRIT: CPT | Performed by: OBSTETRICS & GYNECOLOGY

## 2021-08-04 PROCEDURE — 25010000002 CEFAZOLIN PER 500 MG: Performed by: OBSTETRICS & GYNECOLOGY

## 2021-08-04 PROCEDURE — 85027 COMPLETE CBC AUTOMATED: CPT | Performed by: OBSTETRICS & GYNECOLOGY

## 2021-08-04 PROCEDURE — 51703 INSERT BLADDER CATH COMPLEX: CPT

## 2021-08-04 PROCEDURE — 25010000002 KETOROLAC TROMETHAMINE PER 15 MG: Performed by: OBSTETRICS & GYNECOLOGY

## 2021-08-04 PROCEDURE — 86901 BLOOD TYPING SEROLOGIC RH(D): CPT | Performed by: OBSTETRICS & GYNECOLOGY

## 2021-08-04 PROCEDURE — 80306 DRUG TEST PRSMV INSTRMNT: CPT | Performed by: OBSTETRICS & GYNECOLOGY

## 2021-08-04 PROCEDURE — 25010000002 KETOROLAC TROMETHAMINE PER 15 MG: Performed by: NURSE ANESTHETIST, CERTIFIED REGISTERED

## 2021-08-04 PROCEDURE — 80048 BASIC METABOLIC PNL TOTAL CA: CPT | Performed by: OBSTETRICS & GYNECOLOGY

## 2021-08-04 PROCEDURE — 59515 CESAREAN DELIVERY: CPT | Performed by: OBSTETRICS & GYNECOLOGY

## 2021-08-04 PROCEDURE — 25010000002 MORPHINE PER 10 MG: Performed by: NURSE ANESTHETIST, CERTIFIED REGISTERED

## 2021-08-04 PROCEDURE — 25010000002 ONDANSETRON PER 1 MG: Performed by: NURSE ANESTHETIST, CERTIFIED REGISTERED

## 2021-08-04 PROCEDURE — 59514 CESAREAN DELIVERY ONLY: CPT | Performed by: SPECIALIST/TECHNOLOGIST, OTHER

## 2021-08-04 PROCEDURE — 25010000002 PHENYLEPHRINE 10 MG/ML SOLUTION: Performed by: NURSE ANESTHETIST, CERTIFIED REGISTERED

## 2021-08-04 PROCEDURE — 85018 HEMOGLOBIN: CPT | Performed by: OBSTETRICS & GYNECOLOGY

## 2021-08-04 PROCEDURE — 86900 BLOOD TYPING SEROLOGIC ABO: CPT | Performed by: OBSTETRICS & GYNECOLOGY

## 2021-08-04 PROCEDURE — 86850 RBC ANTIBODY SCREEN: CPT | Performed by: OBSTETRICS & GYNECOLOGY

## 2021-08-04 RX ORDER — ONDANSETRON 2 MG/ML
INJECTION INTRAMUSCULAR; INTRAVENOUS AS NEEDED
Status: DISCONTINUED | OUTPATIENT
Start: 2021-08-04 | End: 2021-08-04 | Stop reason: SURG

## 2021-08-04 RX ORDER — LIDOCAINE HYDROCHLORIDE 10 MG/ML
5 INJECTION, SOLUTION EPIDURAL; INFILTRATION; INTRACAUDAL; PERINEURAL AS NEEDED
Status: DISCONTINUED | OUTPATIENT
Start: 2021-08-04 | End: 2021-08-04 | Stop reason: HOSPADM

## 2021-08-04 RX ORDER — OXYTOCIN/0.9 % SODIUM CHLORIDE 30/500 ML
650 PLASTIC BAG, INJECTION (ML) INTRAVENOUS ONCE
Status: DISCONTINUED | OUTPATIENT
Start: 2021-08-04 | End: 2021-08-06 | Stop reason: HOSPADM

## 2021-08-04 RX ORDER — SODIUM CHLORIDE, SODIUM LACTATE, POTASSIUM CHLORIDE, CALCIUM CHLORIDE 600; 310; 30; 20 MG/100ML; MG/100ML; MG/100ML; MG/100ML
INJECTION, SOLUTION INTRAVENOUS CONTINUOUS PRN
Status: DISCONTINUED | OUTPATIENT
Start: 2021-08-04 | End: 2021-08-04 | Stop reason: SURG

## 2021-08-04 RX ORDER — SCOLOPAMINE TRANSDERMAL SYSTEM 1 MG/1
1 PATCH, EXTENDED RELEASE TRANSDERMAL
Status: CANCELLED | OUTPATIENT
Start: 2021-08-04

## 2021-08-04 RX ORDER — MORPHINE SULFATE 1 MG/ML
INJECTION, SOLUTION EPIDURAL; INTRATHECAL; INTRAVENOUS AS NEEDED
Status: DISCONTINUED | OUTPATIENT
Start: 2021-08-04 | End: 2021-08-04 | Stop reason: SURG

## 2021-08-04 RX ORDER — ONDANSETRON 2 MG/ML
4 INJECTION INTRAMUSCULAR; INTRAVENOUS EVERY 6 HOURS PRN
Status: DISCONTINUED | OUTPATIENT
Start: 2021-08-04 | End: 2021-08-04 | Stop reason: HOSPADM

## 2021-08-04 RX ORDER — OXYTOCIN/0.9 % SODIUM CHLORIDE 30/500 ML
650 PLASTIC BAG, INJECTION (ML) INTRAVENOUS ONCE
Status: COMPLETED | OUTPATIENT
Start: 2021-08-04 | End: 2021-08-04

## 2021-08-04 RX ORDER — SODIUM CHLORIDE, SODIUM LACTATE, POTASSIUM CHLORIDE, CALCIUM CHLORIDE 600; 310; 30; 20 MG/100ML; MG/100ML; MG/100ML; MG/100ML
INJECTION, SOLUTION INTRAVENOUS
Status: COMPLETED
Start: 2021-08-04 | End: 2021-08-04

## 2021-08-04 RX ORDER — CARBOPROST TROMETHAMINE 250 UG/ML
250 INJECTION, SOLUTION INTRAMUSCULAR AS NEEDED
Status: DISCONTINUED | OUTPATIENT
Start: 2021-08-04 | End: 2021-08-04 | Stop reason: HOSPADM

## 2021-08-04 RX ORDER — BUPIVACAINE HYDROCHLORIDE 7.5 MG/ML
INJECTION, SOLUTION EPIDURAL; RETROBULBAR
Status: COMPLETED | OUTPATIENT
Start: 2021-08-04 | End: 2021-08-04

## 2021-08-04 RX ORDER — SODIUM CHLORIDE 0.9 % (FLUSH) 0.9 %
3 SYRINGE (ML) INJECTION EVERY 12 HOURS SCHEDULED
Status: DISCONTINUED | OUTPATIENT
Start: 2021-08-04 | End: 2021-08-04 | Stop reason: HOSPADM

## 2021-08-04 RX ORDER — OXYTOCIN/0.9 % SODIUM CHLORIDE 30/500 ML
85 PLASTIC BAG, INJECTION (ML) INTRAVENOUS ONCE
Status: COMPLETED | OUTPATIENT
Start: 2021-08-04 | End: 2021-08-04

## 2021-08-04 RX ORDER — SODIUM CHLORIDE 0.9 % (FLUSH) 0.9 %
3-10 SYRINGE (ML) INJECTION AS NEEDED
Status: DISCONTINUED | OUTPATIENT
Start: 2021-08-04 | End: 2021-08-04 | Stop reason: HOSPADM

## 2021-08-04 RX ORDER — SIMETHICONE 80 MG
80 TABLET,CHEWABLE ORAL 4 TIMES DAILY PRN
Status: DISCONTINUED | OUTPATIENT
Start: 2021-08-04 | End: 2021-08-06 | Stop reason: HOSPADM

## 2021-08-04 RX ORDER — ONDANSETRON 4 MG/1
4 TABLET, FILM COATED ORAL EVERY 6 HOURS PRN
Status: DISCONTINUED | OUTPATIENT
Start: 2021-08-04 | End: 2021-08-04 | Stop reason: HOSPADM

## 2021-08-04 RX ORDER — TRISODIUM CITRATE DIHYDRATE AND CITRIC ACID MONOHYDRATE 500; 334 MG/5ML; MG/5ML
30 SOLUTION ORAL ONCE
Status: COMPLETED | OUTPATIENT
Start: 2021-08-04 | End: 2021-08-04

## 2021-08-04 RX ORDER — SODIUM CHLORIDE, SODIUM LACTATE, POTASSIUM CHLORIDE, CALCIUM CHLORIDE 600; 310; 30; 20 MG/100ML; MG/100ML; MG/100ML; MG/100ML
150 INJECTION, SOLUTION INTRAVENOUS CONTINUOUS
Status: DISCONTINUED | OUTPATIENT
Start: 2021-08-04 | End: 2021-08-06 | Stop reason: HOSPADM

## 2021-08-04 RX ORDER — PHENYLEPHRINE HYDROCHLORIDE 10 MG/ML
INJECTION INTRAVENOUS AS NEEDED
Status: DISCONTINUED | OUTPATIENT
Start: 2021-08-04 | End: 2021-08-04 | Stop reason: SURG

## 2021-08-04 RX ORDER — KETOROLAC TROMETHAMINE 30 MG/ML
30 INJECTION, SOLUTION INTRAMUSCULAR; INTRAVENOUS EVERY 6 HOURS
Status: DISPENSED | OUTPATIENT
Start: 2021-08-04 | End: 2021-08-05

## 2021-08-04 RX ORDER — KETOROLAC TROMETHAMINE 30 MG/ML
INJECTION, SOLUTION INTRAMUSCULAR; INTRAVENOUS AS NEEDED
Status: DISCONTINUED | OUTPATIENT
Start: 2021-08-04 | End: 2021-08-04 | Stop reason: SURG

## 2021-08-04 RX ORDER — OXYTOCIN/0.9 % SODIUM CHLORIDE 30/500 ML
PLASTIC BAG, INJECTION (ML) INTRAVENOUS
Status: COMPLETED
Start: 2021-08-04 | End: 2021-08-04

## 2021-08-04 RX ORDER — ACETAMINOPHEN 500 MG
1000 TABLET ORAL EVERY 8 HOURS
Status: DISCONTINUED | OUTPATIENT
Start: 2021-08-04 | End: 2021-08-06 | Stop reason: HOSPADM

## 2021-08-04 RX ORDER — OXYCODONE HYDROCHLORIDE 5 MG/1
10 TABLET ORAL EVERY 6 HOURS PRN
Status: DISCONTINUED | OUTPATIENT
Start: 2021-08-04 | End: 2021-08-06 | Stop reason: HOSPADM

## 2021-08-04 RX ORDER — PROMETHAZINE HYDROCHLORIDE 12.5 MG/1
12.5 SUPPOSITORY RECTAL EVERY 6 HOURS PRN
Status: DISCONTINUED | OUTPATIENT
Start: 2021-08-04 | End: 2021-08-04 | Stop reason: HOSPADM

## 2021-08-04 RX ORDER — MORPHINE SULFATE 1 MG/ML
INJECTION, SOLUTION EPIDURAL; INTRATHECAL; INTRAVENOUS AS NEEDED
Status: DISCONTINUED | OUTPATIENT
Start: 2021-08-04 | End: 2021-08-04

## 2021-08-04 RX ORDER — METHYLERGONOVINE MALEATE 0.2 MG/ML
200 INJECTION INTRAVENOUS ONCE AS NEEDED
Status: DISCONTINUED | OUTPATIENT
Start: 2021-08-04 | End: 2021-08-04 | Stop reason: HOSPADM

## 2021-08-04 RX ORDER — LANOLIN 100 %
OINTMENT (GRAM) TOPICAL
Status: DISCONTINUED | OUTPATIENT
Start: 2021-08-04 | End: 2021-08-06 | Stop reason: HOSPADM

## 2021-08-04 RX ORDER — SCOLOPAMINE TRANSDERMAL SYSTEM 1 MG/1
1 PATCH, EXTENDED RELEASE TRANSDERMAL
Status: DISCONTINUED | OUTPATIENT
Start: 2021-08-04 | End: 2021-08-06 | Stop reason: HOSPADM

## 2021-08-04 RX ORDER — DIPHENHYDRAMINE HYDROCHLORIDE 50 MG/ML
25 INJECTION INTRAMUSCULAR; INTRAVENOUS EVERY 4 HOURS PRN
Status: DISCONTINUED | OUTPATIENT
Start: 2021-08-04 | End: 2021-08-06 | Stop reason: HOSPADM

## 2021-08-04 RX ORDER — ONDANSETRON 2 MG/ML
4 INJECTION INTRAMUSCULAR; INTRAVENOUS ONCE AS NEEDED
Status: ACTIVE | OUTPATIENT
Start: 2021-08-04 | End: 2021-08-05

## 2021-08-04 RX ORDER — BUPIVACAINE HCL/0.9 % NACL/PF 0.1 %
2 PLASTIC BAG, INJECTION (ML) EPIDURAL ONCE
Status: COMPLETED | OUTPATIENT
Start: 2021-08-04 | End: 2021-08-04

## 2021-08-04 RX ORDER — IBUPROFEN 800 MG/1
800 TABLET ORAL EVERY 8 HOURS SCHEDULED
Status: DISCONTINUED | OUTPATIENT
Start: 2021-08-05 | End: 2021-08-06 | Stop reason: HOSPADM

## 2021-08-04 RX ORDER — PRENATAL VIT/IRON FUM/FOLIC AC 27MG-0.8MG
1 TABLET ORAL DAILY
Status: DISCONTINUED | OUTPATIENT
Start: 2021-08-05 | End: 2021-08-06 | Stop reason: HOSPADM

## 2021-08-04 RX ORDER — HYDROCORTISONE 25 MG/G
CREAM TOPICAL 3 TIMES DAILY PRN
Status: DISCONTINUED | OUTPATIENT
Start: 2021-08-04 | End: 2021-08-06 | Stop reason: HOSPADM

## 2021-08-04 RX ORDER — MISOPROSTOL 200 UG/1
800 TABLET ORAL AS NEEDED
Status: DISCONTINUED | OUTPATIENT
Start: 2021-08-04 | End: 2021-08-04 | Stop reason: HOSPADM

## 2021-08-04 RX ORDER — NALOXONE HCL 0.4 MG/ML
0.2 VIAL (ML) INJECTION
Status: DISCONTINUED | OUTPATIENT
Start: 2021-08-04 | End: 2021-08-06 | Stop reason: HOSPADM

## 2021-08-04 RX ORDER — PROMETHAZINE HYDROCHLORIDE 12.5 MG/1
12.5 TABLET ORAL EVERY 6 HOURS PRN
Status: DISCONTINUED | OUTPATIENT
Start: 2021-08-04 | End: 2021-08-04 | Stop reason: HOSPADM

## 2021-08-04 RX ORDER — DIPHENHYDRAMINE HCL 25 MG
25 CAPSULE ORAL EVERY 4 HOURS PRN
Status: DISCONTINUED | OUTPATIENT
Start: 2021-08-04 | End: 2021-08-06 | Stop reason: HOSPADM

## 2021-08-04 RX ADMIN — ACETAMINOPHEN 1000 MG: 500 TABLET, FILM COATED ORAL at 23:08

## 2021-08-04 RX ADMIN — SODIUM CHLORIDE, POTASSIUM CHLORIDE, SODIUM LACTATE AND CALCIUM CHLORIDE 1000 ML: 600; 310; 30; 20 INJECTION, SOLUTION INTRAVENOUS at 19:06

## 2021-08-04 RX ADMIN — MORPHINE SULFATE 0.2 MG: 1 INJECTION, SOLUTION EPIDURAL; INTRATHECAL; INTRAVENOUS at 12:17

## 2021-08-04 RX ADMIN — Medication 85 ML/HR: at 15:17

## 2021-08-04 RX ADMIN — SODIUM CHLORIDE, POTASSIUM CHLORIDE, SODIUM LACTATE AND CALCIUM CHLORIDE 150 ML/HR: 600; 310; 30; 20 INJECTION, SOLUTION INTRAVENOUS at 23:07

## 2021-08-04 RX ADMIN — SODIUM CHLORIDE, POTASSIUM CHLORIDE, SODIUM LACTATE AND CALCIUM CHLORIDE 1000 ML: 600; 310; 30; 20 INJECTION, SOLUTION INTRAVENOUS at 18:28

## 2021-08-04 RX ADMIN — SODIUM CHLORIDE, POTASSIUM CHLORIDE, SODIUM LACTATE AND CALCIUM CHLORIDE 1000 ML: 600; 310; 30; 20 INJECTION, SOLUTION INTRAVENOUS at 10:31

## 2021-08-04 RX ADMIN — OXYTOCIN-SODIUM CHLORIDE 0.9% IV SOLN 30 UNIT/500ML 650 ML/HR: 30-0.9/5 SOLUTION at 12:35

## 2021-08-04 RX ADMIN — PHENYLEPHRINE HYDROCHLORIDE 100 MCG: 10 INJECTION INTRAVENOUS at 12:28

## 2021-08-04 RX ADMIN — SODIUM CHLORIDE, POTASSIUM CHLORIDE, SODIUM LACTATE AND CALCIUM CHLORIDE 1000 ML: 600; 310; 30; 20 INJECTION, SOLUTION INTRAVENOUS at 15:41

## 2021-08-04 RX ADMIN — Medication 2 G: at 12:20

## 2021-08-04 RX ADMIN — Medication 1.8 MG: at 12:49

## 2021-08-04 RX ADMIN — KETOROLAC TROMETHAMINE 30 MG: 30 INJECTION, SOLUTION INTRAMUSCULAR at 13:15

## 2021-08-04 RX ADMIN — SCOLOPAMINE TRANSDERMAL SYSTEM 1 PATCH: 1 PATCH, EXTENDED RELEASE TRANSDERMAL at 17:00

## 2021-08-04 RX ADMIN — ONDANSETRON HYDROCHLORIDE 8 MG: 2 INJECTION INTRAMUSCULAR; INTRAVENOUS at 12:05

## 2021-08-04 RX ADMIN — SODIUM CHLORIDE, POTASSIUM CHLORIDE, SODIUM LACTATE AND CALCIUM CHLORIDE: 600; 310; 30; 20 INJECTION, SOLUTION INTRAVENOUS at 12:10

## 2021-08-04 RX ADMIN — PHENYLEPHRINE HYDROCHLORIDE 100 MCG: 10 INJECTION INTRAVENOUS at 12:53

## 2021-08-04 RX ADMIN — OXYTOCIN-SODIUM CHLORIDE 0.9% IV SOLN 30 UNIT/500ML 85 ML/HR: 30-0.9/5 SOLUTION at 13:05

## 2021-08-04 RX ADMIN — OXYTOCIN-SODIUM CHLORIDE 0.9% IV SOLN 30 UNIT/500ML 85 ML/HR: 30-0.9/5 SOLUTION at 15:17

## 2021-08-04 RX ADMIN — BUPIVACAINE HYDROCHLORIDE 1.8 ML: 7.5 INJECTION, SOLUTION EPIDURAL; RETROBULBAR at 12:17

## 2021-08-04 RX ADMIN — SODIUM CITRATE AND CITRIC ACID MONOHYDRATE 30 ML: 500; 334 SOLUTION ORAL at 12:04

## 2021-08-04 RX ADMIN — PHENYLEPHRINE HYDROCHLORIDE 100 MCG: 10 INJECTION INTRAVENOUS at 12:25

## 2021-08-04 RX ADMIN — SODIUM CHLORIDE, POTASSIUM CHLORIDE, SODIUM LACTATE AND CALCIUM CHLORIDE: 600; 310; 30; 20 INJECTION, SOLUTION INTRAVENOUS at 12:53

## 2021-08-04 RX ADMIN — KETOROLAC TROMETHAMINE 30 MG: 30 INJECTION, SOLUTION INTRAMUSCULAR; INTRAVENOUS at 17:01

## 2021-08-04 RX ADMIN — KETOROLAC TROMETHAMINE 30 MG: 30 INJECTION, SOLUTION INTRAMUSCULAR; INTRAVENOUS at 23:08

## 2021-08-04 NOTE — INTERVAL H&P NOTE
Labor and delivery scan vertex presentation.  Again reviewed risk benefits alternatives repeat  section versus trial of labor wants to have repeat  section

## 2021-08-04 NOTE — ANESTHESIA PROCEDURE NOTES
Spinal Block      Patient reassessed immediately prior to procedure    Patient location during procedure: OB  Start Time: 8/4/2021 12:10 PM  Stop Time: 8/4/2021 12:17 PM  Indication:at surgeon's request and procedure for pain  Performed By  CRNA: Leandro Velasco CRNA  Preanesthetic Checklist  Completed: patient identified, IV checked, site marked, risks and benefits discussed, surgical consent, monitors and equipment checked, pre-op evaluation and timeout performed  Spinal Block Prep:  Patient Position:sitting  Sterile Tech:cap, gloves, mask and sterile barriers  Prep:Betadine  Patient Monitoring:blood pressure monitoring, continuous pulse oximetry and EKG  Spinal Block Procedure  Approach:midline  Guidance:landmark technique  Location:L3-L4  Needle Type:Pencan  Needle Gauge:24 G  Placement of Spinal needle event:cerebrospinal fluid aspirated  Paresthesia: no  Fluid Appearance:clear  Medications: bupivacaine PF (MARCAINE) 0.75 % injection, 1.8 mL  Med Administered at 8/4/2021 12:17 PM   Post Assessment  Patient Tolerance:patient tolerated the procedure well with no apparent complications  Complications no

## 2021-08-04 NOTE — ANESTHESIA POSTPROCEDURE EVALUATION
Patient: Ramesh Lebron    Procedure Summary     Date: 21 Room / Location: [facility] LABOR DELIVERY 1 / [facility] LABOR DELIVERY    Anesthesia Start: 1210 Anesthesia Stop: 1329    Procedure:  SECTION REPEAT (N/A Abdomen) Diagnosis:       History of  section      (History of  section [Z98.891])    Surgeons: Evangelista Pimentel MD Provider: Leandro Velasco CRNA    Anesthesia Type: spinal ASA Status: 2          Anesthesia Type: spinal    Vitals  No vitals data found for the desired time range.          Post Anesthesia Care and Evaluation    Patient location during evaluation: PACU  Patient participation: complete - patient participated  Level of consciousness: awake and awake and alert  Pain score: 0  Pain management: adequate  Airway patency: patent  Anesthetic complications: No anesthetic complications  PONV Status: none  Cardiovascular status: acceptable and stable  Respiratory status: acceptable, room air and spontaneous ventilation  Hydration status: acceptable  Post Neuraxial Block status: No signs or symptoms of PDPH  Comments: BP:  119/57  HR:  71  SAT:  98  RR:  18  TEMP: 97.0

## 2021-08-04 NOTE — ANESTHESIA PREPROCEDURE EVALUATION
Anesthesia Evaluation     Patient summary reviewed and Nursing notes reviewed   NPO Solid Status: > 8 hours  NPO Liquid Status: > 8 hours           Airway   Mallampati: II  TM distance: >3 FB  Neck ROM: full  No difficulty expected  Dental - normal exam     Pulmonary - normal exam   (+) a smoker Former,   Cardiovascular - negative cardio ROS and normal exam        Neuro/Psych- negative ROS  GI/Hepatic/Renal/Endo    (+) obesity,       Musculoskeletal (-) negative ROS    Abdominal  - normal exam   Substance History - negative use     OB/GYN    (+) Pregnant,         Other - negative ROS                       Anesthesia Plan    ASA 2     spinal       Anesthetic plan, all risks, benefits, and alternatives have been provided, discussed and informed consent has been obtained with: patient.

## 2021-08-04 NOTE — OP NOTE
Section Procedure Note    Ramesh Lebron    2021     Indications: previous  section low transverse    Preoperative Diagnosis: Intrauterine pregnancy at 39-5/7 weeks; prior  section declines trial of labor    Postoperative Diagnosis: Same in addition very thin lower uterine segment; caudal extension about 3 cm on right corner of hysterotomy extension would not recommend trial of labor in future pregnancy    PROCEDURES PERFORMED: Procedure(s): Repeat   SECTION REPEAT low transverse uterine incision but with 3 cm caudal extension on right    SURGEON: Evangelista Pimentel MD, FACOG    ASSISTANT: Karyna vega CSA was responsible for performing the following activities: Retraction, Suction, Irrigation, Suturing, Closing and Placing Dressing and their skilled assistance was necessary for the success of this case.      STAFF:   Circulator: Val Amin RN  Scrub Person: Candace Marroquin  L & [place] Nurse: Vance Jones RN  Assistant: Karyna Vega CSA    ANESTHESIA: Spinal    ANESTHESIA STAFF:  CRNA: Leandro Velasco CRNA  Student Nurse Anesthetist: Nahum Stevens SRNA    Findings: Large amount of scarring loss of anatomy anterior abdominal wall.  The lower uterine segment was very thin.  Single infant vertex presentation.  caudal extension about 3 cm on right corner of hysterotomy extension would not recommend trial of labor in future pregnancy      Birth Information  YOB: 2020   Time of birth: 12:34 PM   Delivering clinician: Evangelista Pimentel   Sex: female   Delivery type: , Low Transverse   Breech type (if applicable):     Observed anomalies/comments:         Estimated Blood Loss:  500           Drains: Vazquez                 Specimens: None               Complications:  None; patient tolerated the procedure well.           Disposition: Mother Baby Unit           Condition: stable    Procedure Details   After satisfactory anesthesia was obtained,  the patient was prepped and draped in the supine position. There was a left uterine displacement. The anesthetic was tested and found to be adequate. Through the lines of the prior incision an incision was made and carried down to the fascia. The fascia was incised and the incision was made and carried laterally and superiorly on each side. The fascia was dissected off the linea alba first cephalad and then caudally. This was done with some difficulty because of scarring. Rectus muscles were divided with blunt dissection. Peritoneum was grasped between 2 hemostats and incised.  A bladder flap was created from the visceral peritoneum.  The lower uterine segment was extremely thin especially in the center.  I went high to try and avoid the thinnest portion of the lower uterine segment..  The uterus was incised in a transverse manner.. This was extended with the operators fingers in a cephalad caudal fashion and a single infant delivered. Cord clamped and cut. The infant was shown to the patient. Cord blood was obtained. Placenta delivered. Uterus checked for retained products of conception.  caudal extension about 3 cm on right corner of hysterotomy extension would not recommend trial of labor in future pregnancy  Uterine incision closed 0 chromic in a running fashion imbricating 0 chromic in a running fashion. Bleeding points made hemostatic with 2-0 chromic.    Gutters were cleansed of blood. Cul-de-sac cleansed of blood. Uterus placed in the abdominal cavity.  Hysterotomy incision site viewed hemostatic.    The parietal peritoneum was closed with 0 Vicryl in a running fashion. Sponge, instrument and needle count correct.  Meticulous subfascial hemostasis obtained. The fascia was closed with 0 Vicryl from each side and joined in the middle. Fascia was well approximated visually and to palpation and no evidence of leakage. Sponge, instrument and needle count correct. Wendi's and fatty tissue closed with 0 plain.  Sponge, instrument and needle count correct. Skin closed 4-0 Monocryl subcutaneous. Sponge and needle count correct. Mastisol Steri-strips applied. Patient taken to recovery room in good condition.              This document has been electronically signed by Evangelista Pimentel MD on August 4, 2021 14:02 CDT

## 2021-08-04 NOTE — PROGRESS NOTES
Patient post  approximately 5 hours.  Had good output recovery room about 100 cc after this out took somewhat more difficult because of leaking from catheter.  I Lyn check CBC BMP have put a fresh collecting system.  We will also give bolus and observe

## 2021-08-04 NOTE — PLAN OF CARE
Problem: Adult Inpatient Plan of Care  Goal: Plan of Care Review  Outcome: Ongoing, Progressing  Flowsheets (Taken 8/4/2021 1611)  Progress: improving  Plan of Care Reviewed With: patient  Outcome Summary: VSS, fundus firm -2 below umb midline, light to moderate vaginal bleeding, denies pain, occasional nausea and vomitting in recovery, ortez draining clear yellow urine, LTV incidsion with pressure dressing clean dry and intact, iv infusing LR and saline with pitocin   Goal Outcome Evaluation:

## 2021-08-04 NOTE — NON STRESS TEST
Ramesh Lebron, navid  at 39w5d with an CHICHI of 2021, by Ultrasound, was seen at [facility] LABOR DELIVERY for a nonstress test.    Chief Complaint   Patient presents with    Scheduled        Patient Active Problem List   Diagnosis    Young primigravida in third trimester    Chlamydia infection during pregnancy    Obesity affecting pregnancy in third trimester    Chorioamnionitis in third trimester    PPH (postpartum hemorrhage)    PROM (premature rupture of membranes)    Single delivery by  section    Postoperative follow-up    Postoperative state    Initiation of oral contraception    History of  section    Encounter for other  screening follow-up     screening based on amniocentesis    History of polyhydramnios       Start Time: 1012  Stop Time: 1032    Interpretation A  Nonstress Test Interpretation A: Reactive (21 1113 : Elba Rendon, RN)  Comments A: Reviewed with CHAR Márquez RN (21 1113 : Elba Rendon, RN)  Fetal heart rate strip reviewed agree reactive    ICD history  section

## 2021-08-05 LAB
BASOPHILS # BLD AUTO: 0.03 10*3/MM3 (ref 0–0.2)
BASOPHILS NFR BLD AUTO: 0.2 % (ref 0–1.5)
DEPRECATED RDW RBC AUTO: 45.2 FL (ref 37–54)
EOSINOPHIL # BLD AUTO: 0.21 10*3/MM3 (ref 0–0.4)
EOSINOPHIL NFR BLD AUTO: 1.3 % (ref 0.3–6.2)
ERYTHROCYTE [DISTWIDTH] IN BLOOD BY AUTOMATED COUNT: 15 % (ref 12.3–15.4)
HCT VFR BLD AUTO: 28.1 % (ref 34–46.6)
HGB BLD-MCNC: 9.1 G/DL (ref 12–15.9)
IMM GRANULOCYTES # BLD AUTO: 0.08 10*3/MM3 (ref 0–0.05)
IMM GRANULOCYTES NFR BLD AUTO: 0.5 % (ref 0–0.5)
LYMPHOCYTES # BLD AUTO: 2.16 10*3/MM3 (ref 0.7–3.1)
LYMPHOCYTES NFR BLD AUTO: 13.5 % (ref 19.6–45.3)
MCH RBC QN AUTO: 27.5 PG (ref 26.6–33)
MCHC RBC AUTO-ENTMCNC: 32.4 G/DL (ref 31.5–35.7)
MCV RBC AUTO: 84.9 FL (ref 79–97)
MONOCYTES # BLD AUTO: 1.32 10*3/MM3 (ref 0.1–0.9)
MONOCYTES NFR BLD AUTO: 8.3 % (ref 5–12)
NEUTROPHILS NFR BLD AUTO: 12.17 10*3/MM3 (ref 1.7–7)
NEUTROPHILS NFR BLD AUTO: 76.2 % (ref 42.7–76)
NRBC BLD AUTO-RTO: 0 /100 WBC (ref 0–0.2)
PLATELET # BLD AUTO: 197 10*3/MM3 (ref 140–450)
PMV BLD AUTO: 11.1 FL (ref 6–12)
RBC # BLD AUTO: 3.31 10*6/MM3 (ref 3.77–5.28)
WBC # BLD AUTO: 15.97 10*3/MM3 (ref 3.4–10.8)

## 2021-08-05 PROCEDURE — 94799 UNLISTED PULMONARY SVC/PX: CPT

## 2021-08-05 PROCEDURE — 25010000002 KETOROLAC TROMETHAMINE PER 15 MG: Performed by: OBSTETRICS & GYNECOLOGY

## 2021-08-05 PROCEDURE — 85025 COMPLETE CBC W/AUTO DIFF WBC: CPT | Performed by: OBSTETRICS & GYNECOLOGY

## 2021-08-05 RX ADMIN — IBUPROFEN 800 MG: 800 TABLET, FILM COATED ORAL at 22:07

## 2021-08-05 RX ADMIN — IBUPROFEN 800 MG: 800 TABLET, FILM COATED ORAL at 17:47

## 2021-08-05 RX ADMIN — KETOROLAC TROMETHAMINE 30 MG: 30 INJECTION, SOLUTION INTRAMUSCULAR; INTRAVENOUS at 05:19

## 2021-08-05 RX ADMIN — ACETAMINOPHEN 1000 MG: 500 TABLET, FILM COATED ORAL at 22:07

## 2021-08-05 RX ADMIN — ACETAMINOPHEN 1000 MG: 500 TABLET, FILM COATED ORAL at 08:29

## 2021-08-05 RX ADMIN — PRENATAL VIT W/ FE FUMARATE-FA TAB 27-0.8 MG 1 TABLET: 27-0.8 TAB at 08:29

## 2021-08-05 NOTE — PROGRESS NOTES
[facility]  Ramesh Lebron  : 2000  MRN: [id number]  CSN: [id number]    Post-operative Day #1  Subjective   Her pain is well controlled.  Vaginal bleeding is appropriate amount.  She is passing gas and has not had a bowel movement.     Objective     Min/max vitals past 24 hours:   Temp  Min: 97.5 °F (36.4 °C)  Max: 98.3 °F (36.8 °C)  BP  Min: 97/50  Max: 132/77  Pulse  Min: 59  Max: 104  Pulse  Min: 59  Max: 104        General: well developed; well nourished  no acute distress   Abdomen: soft, non-tender; no masses   Pelvic: Not performed   Ext: Calves NT     Lab Results   Component Value Date    WBC 15.97 (H) 2021    HGB 9.1 (L) 2021    HCT 28.1 (L) 2021    MCV 84.9 2021     2021    RH Positive 2021    HEPBSAG Non-Reactive 2021        Assessment    1. POD #1 S/P  delivery patient doing well up out of bed.  Urine output measured 1100 over about 18 hours plus that was lost from system.  Hemoglobin original 10.7 this a.m. 9.1     Plan   1. Continue routine postpartum care  2. Continue routine post-operative care          This document has been electronically signed by Evangelista Pimentel MD on 2021 07:22 CDT

## 2021-08-05 NOTE — PLAN OF CARE
Goal Outcome Evaluation:  Plan of Care Reviewed With: patient        Progress: improving  Outcome Summary: VSS, FF, bleeding light, saline locked, voids and pain is controlled,

## 2021-08-05 NOTE — PLAN OF CARE
Goal Outcome Evaluation:  Plan of Care Reviewed With: patient        Progress: improving  Outcome Summary: VSS, FF,labs pending, monitoring output, pain controlled, post op n/v scop patch applied,

## 2021-08-05 NOTE — PLAN OF CARE
Problem: Adult Inpatient Plan of Care  Goal: Plan of Care Review  Outcome: Ongoing, Progressing  Flowsheets  Taken 2021 1614 by Evelin Canchola RN  Outcome Summary: vss, ff, bleeding minimal, IV d/c, pain controlled with scheduled meds  Taken 2021 0549 by Nicki Branham RN  Progress: improving  Plan of Care Reviewed With: patient  Goal: Patient-Specific Goal (Individualized)  Outcome: Ongoing, Progressing  Goal: Absence of Hospital-Acquired Illness or Injury  Outcome: Ongoing, Progressing  Intervention: Identify and Manage Fall Risk  Recent Flowsheet Documentation  Taken 2021 1500 by Evelin Canchola RN  Safety Promotion/Fall Prevention:   safety round/check completed   assistive device/personal items within reach   clutter free environment maintained  Goal: Optimal Comfort and Wellbeing  Outcome: Ongoing, Progressing  Goal: Readiness for Transition of Care  Outcome: Ongoing, Progressing     Problem: Adjustment to Role Transition (Postpartum  Delivery)  Goal: Successful Maternal Role Transition  Outcome: Ongoing, Progressing     Problem: Bleeding (Postpartum  Delivery)  Goal: Hemostasis  Outcome: Ongoing, Progressing     Problem: Infection (Postpartum  Delivery)  Goal: Absence of Infection Signs and Symptoms  Outcome: Ongoing, Progressing     Problem: Pain (Postpartum  Delivery)  Goal: Acceptable Pain Control  Outcome: Ongoing, Progressing     Problem: Postoperative Nausea and Vomiting (Postpartum  Delivery)  Goal: Nausea and Vomiting Relief  Outcome: Ongoing, Progressing     Problem: Postoperative Urinary Retention (Postpartum  Delivery)  Goal: Effective Urinary Elimination  Outcome: Ongoing, Progressing     Problem:  Fall Injury Risk  Goal: Absence of Fall, Infant Drop and Related Injury  Outcome: Ongoing, Progressing  Intervention: Promote Injury-Free Environment  Recent Flowsheet Documentation  Taken 2021 1500 by Sushant  Evelin SCHOFIELD RN  Safety Promotion/Fall Prevention:   safety round/check completed   assistive device/personal items within reach   clutter free environment maintained     Problem: Device-Related Complication Risk (Anesthesia/Analgesia, Neuraxial)  Goal: Safe Infusion Delivery Completion  Outcome: Ongoing, Progressing     Problem: Infection (Anesthesia/Analgesia, Neuraxial)  Goal: Absence of Infection Signs and Symptoms  Outcome: Ongoing, Progressing     Problem: Nausea and Vomiting (Anesthesia/Analgesia, Neuraxial)  Goal: Nausea and Vomiting Relief  Outcome: Ongoing, Progressing     Problem: Pain (Anesthesia/Analgesia, Neuraxial)  Goal: Effective Pain Control  Outcome: Ongoing, Progressing     Problem: Respiratory Compromise (Anesthesia/Analgesia, Neuraxial)  Goal: Effective Oxygenation and Ventilation  Outcome: Ongoing, Progressing     Problem: Sensorimotor Impairment (Anesthesia/Analgesia, Neuraxial)  Goal: Baseline Motor Function  Outcome: Ongoing, Progressing  Intervention: Optimize Sensorimotor Function  Recent Flowsheet Documentation  Taken 8/5/2021 1500 by Evelin Canchola RN  Safety Promotion/Fall Prevention:   safety round/check completed   assistive device/personal items within reach   clutter free environment maintained     Problem: Urinary Retention (Anesthesia/Analgesia, Neuraxial)  Goal: Effective Urinary Elimination  Outcome: Ongoing, Progressing     Problem: Breastfeeding  Goal: Effective Breastfeeding  Outcome: Ongoing, Progressing   Goal Outcome Evaluation:              Outcome Summary: vss, ff, bleeding minimal, IV d/c, pain controlled with scheduled meds

## 2021-08-06 VITALS
OXYGEN SATURATION: 98 % | DIASTOLIC BLOOD PRESSURE: 58 MMHG | HEART RATE: 90 BPM | SYSTOLIC BLOOD PRESSURE: 117 MMHG | TEMPERATURE: 98.2 F | RESPIRATION RATE: 16 BRPM

## 2021-08-06 RX ORDER — IBUPROFEN 800 MG/1
800 TABLET ORAL EVERY 8 HOURS SCHEDULED
Qty: 42 TABLET | Refills: 2 | Status: SHIPPED | OUTPATIENT
Start: 2021-08-06 | End: 2021-08-20

## 2021-08-06 RX ORDER — ACETAMINOPHEN 500 MG
1000 TABLET ORAL EVERY 8 HOURS
Qty: 84 TABLET | Refills: 2 | Status: SHIPPED | OUTPATIENT
Start: 2021-08-06 | End: 2021-08-20

## 2021-08-06 RX ORDER — OXYCODONE HYDROCHLORIDE 5 MG/1
5 TABLET ORAL EVERY 6 HOURS PRN
Status: DISCONTINUED | OUTPATIENT
Start: 2021-08-06 | End: 2021-08-06 | Stop reason: HOSPADM

## 2021-08-06 RX ORDER — OXYCODONE HYDROCHLORIDE 5 MG/1
5 TABLET ORAL EVERY 6 HOURS PRN
Qty: 18 TABLET | Refills: 0 | Status: SHIPPED | OUTPATIENT
Start: 2021-08-06 | End: 2021-08-13

## 2021-08-06 RX ADMIN — ACETAMINOPHEN 1000 MG: 500 TABLET, FILM COATED ORAL at 05:43

## 2021-08-06 RX ADMIN — PRENATAL VIT W/ FE FUMARATE-FA TAB 27-0.8 MG 1 TABLET: 27-0.8 TAB at 09:07

## 2021-08-06 RX ADMIN — IBUPROFEN 800 MG: 800 TABLET, FILM COATED ORAL at 05:43

## 2021-08-06 NOTE — DISCHARGE SUMMARY
[facility]  Ramesh Lebron  : 2000  MRN: [id number]  CSN: [id number]    Discharge Summary      Date of Admission: 2021   Date of Discharge: 2021   Principle Discharge Dx:   History of  section    History of polyhydramnios    Single delivery by  section     Procedures Performed: Procedure(s):   SECTION REPEAT with low transverse uterine incision   Brief History: Patient is a 20 y.o. now  who presented to labor and delivery history of prior  section after extensive [place] counseling wanted repeat  section.  Underwent this..   Hospital Course: Her post-operative course was unremarkable.  On POD # 2 she expressed the desire for D/C. She had no febrile morbidity. She had passed gas, and was urinating normally. She was eating a regular diet without difficulty. She was ambulating well. Her incision was clean, dry and intact. Discharge instructions were given.  All questions were answered        Condition:  Discharge Activity:  Discharge Diet: Stable  Activity Instructions     Pelvic Rest          Regular   Discharge Medications:    Your medication list      START taking these medications      Instructions Last Dose Given Next Dose Due   acetaminophen 500 MG tablet  Commonly known as: TYLENOL      Take 2 tablets by mouth Every 8 (Eight) Hours for 14 days.       ibuprofen 800 MG tablet  Commonly known as: ADVIL,MOTRIN      Take 1 tablet by mouth Every 8 (Eight) Hours for 14 days.       oxyCODONE 5 MG immediate release tablet  Commonly known as: ROXICODONE      Take 1 tablet by mouth Every 6 (Six) Hours As Needed for Moderate Pain  for up to 7 days.          CONTINUE taking these medications      Instructions Last Dose Given Next Dose Due   multivitamin with minerals tablet tablet      Take 1 tablet by mouth Daily.             Where to Get Your Medications      These medications were sent to "[id number] - [place], [place] - [address] AT [facility] - [phone]  - [phone] FX  [address], [facility] [address]    Phone: [phone] ·   acetaminophen 500 MG tablet  · ibuprofen 800 MG tablet  · oxyCODONE 5 MG immediate release tablet        Discharge Disposition: home   Follow-up: Future Appointments   Date Time Provider Department Center   8/9/2021  3:30 PM Evangelista Pimentel MD [facility] None            This document has been electronically signed by Evangelista Pimentel MD on August 6, 2021 12:57 CDT

## 2021-08-06 NOTE — PROGRESS NOTES
[facility]  who had a  section at 39w5d for prior .   Surgical Procedures Since Admission:  Procedure(s):   SECTION REPEAT  Surgeon:  Evangelista Pimentel MD  Status:  2 Days Post-Op  -------------------     : 2000  MRN: [id number]  CSN: [id number]    Postpartum Day #2  Subjective   Patient is overall stable this morning, she is ambulating without difficulty, tolerating a regular diet.  Vazquez catheter has been removed and she is urinating without difficulty.  Patient states that she had a BM around 3am.  Pain is well controlled this morning.  Patient desires OCP for contraception. Baby is doing well.      Objective     Min/max vitals past 24 hours:   Temp  Min: 97.4 °F (36.3 °C)  Max: 98.6 °F (37 °C)  BP  Min: 100/61  Max: 115/56  Pulse  Min: 66  Max: 73  Pulse  Min: 66  Max: 73        Heart:   Lungs:     Abdomen: RRR. S1 and S2 present.    Lungs clear to auscultation bilaterally.   Soft, nontender, bowel sounds normal, no masses   Fundus is FIRM and NONTENDER.  Palpated at uterus.   Incision is clean dry and intact with no drainage or erythema   Extremities: Calves- Nontender. Trace non-pitting edema local to ankles.    Pelvic: deferred     Lab Results   Component Value Date    WBC 15.97 (H) 2021    HGB 9.1 (L) 2021    HCT 28.1 (L) 2021    MCV 84.9 2021     2021    RH Positive 2021    HEPBSAG Non-Reactive 2021        Assessment   1. Postpartum Day #2 S/P  section, doing well and recovering appropriately at this time.  Vital signs reviewed and reassuring.      Plan   1. Continue routine postpartum care  2. Continue postoperative care  3. Encourage ambulation and breast-feeding  4. Regular diet  5. Continue current pain control regimen            This document has been electronically signed by Amirah Hayes MD on 2021 07:12 CDT   I have seen and evaluated the  patient.  I have discussed the case with the resident. I have reviewed the notes, assessment and plan, and/or procedures performed by the resident. I concur with the resident’s documentation.        This document has been electronically signed by Evangelista Pimentel MD on August 6, 2021 13:01 CDT

## 2021-08-06 NOTE — LACTATION NOTE
Spoke with mom about breastfeeding, states that she would like to do both breast and Bottle. Encouraged that any time baby is given a bottle to stimulate breast so to ensure milk to come in. Was given a breast pump for home.

## 2021-08-11 ENCOUNTER — TELEPHONE (OUTPATIENT)
Dept: LACTATION | Facility: HOSPITAL | Age: 21
End: 2021-08-11

## 2021-10-20 RX ORDER — IBUPROFEN 800 MG/1
TABLET ORAL
Qty: 42 TABLET | Refills: 2 | OUTPATIENT
Start: 2021-10-20

## 2021-10-20 RX ORDER — ACETAMINOPHEN 500 MG
TABLET ORAL
Qty: 84 TABLET | Refills: 2 | OUTPATIENT
Start: 2021-10-20

## 2022-05-20 ENCOUNTER — OFFICE VISIT (OUTPATIENT)
Dept: OBSTETRICS AND GYNECOLOGY | Facility: CLINIC | Age: 22
End: 2022-05-20

## 2022-05-20 VITALS
BODY MASS INDEX: 38.77 KG/M2 | DIASTOLIC BLOOD PRESSURE: 76 MMHG | SYSTOLIC BLOOD PRESSURE: 110 MMHG | HEIGHT: 67 IN | WEIGHT: 247 LBS

## 2022-05-20 DIAGNOSIS — Z30.011 ENCOUNTER FOR BCP (BIRTH CONTROL PILLS) INITIAL PRESCRIPTION: Primary | ICD-10-CM

## 2022-05-20 PROCEDURE — 99213 OFFICE O/P EST LOW 20 MIN: CPT | Performed by: NURSE PRACTITIONER

## 2022-05-20 RX ORDER — NORETHINDRONE ACETATE AND ETHINYL ESTRADIOL 1MG-20(21)
1 KIT ORAL DAILY
Qty: 84 TABLET | Refills: 3 | Status: SHIPPED | OUTPATIENT
Start: 2022-05-20 | End: 2022-09-28 | Stop reason: HOSPADM

## 2022-05-20 NOTE — PROGRESS NOTES
Subjective   Ramesh Lebron is a 21 y.o. desires BC pills    Ramesh is a 21 yr old  who desires to start BC pills again.  She would like a different BC pill she previously was prescribed as she felt it made her more campuzano. She previously was prescribed Ortho-Novum.  Declines other forms of BC options. Denies migraines with aura, clotting disorder or hx of VTE/PE. Occassionally, vapes nicotine.  LMP 2022. Declines STI screening.       The following portions of the patient's history were reviewed and updated as appropriate: allergies, current medications, past family history, past medical history, past social history, past surgical history and problem list.    Review of Systems   Constitutional: Negative for chills, fatigue and fever.   Respiratory: Negative for shortness of breath.    Cardiovascular: Negative for chest pain and palpitations.   Gastrointestinal: Negative for abdominal pain, constipation, diarrhea and nausea.   Genitourinary: Negative for dysuria, frequency, menstrual problem, pelvic pressure, vaginal bleeding, vaginal discharge and vaginal pain.   Skin: Negative for rash.   Neurological: Negative for headache.   Psychiatric/Behavioral: Negative for depressed mood.       Objective   Physical Exam  Vitals and nursing note reviewed.   Constitutional:       Appearance: She is well-developed.   HENT:      Head: Normocephalic.   Pulmonary:      Effort: Pulmonary effort is normal.   Musculoskeletal:         General: Normal range of motion.   Skin:     General: Skin is warm and dry.   Neurological:      Mental Status: She is alert and oriented to person, place, and time.   Psychiatric:         Behavior: Behavior normal.           Assessment & Plan   Diagnoses and all orders for this visit:    1. Encounter for BCP (birth control pills) initial prescription (Primary)    Other orders  -     norethindrone-ethinyl estradiol FE (Junel FE 1/20) 1-20 MG-MCG per tablet; Take 1 tablet by  mouth Daily.  Dispense: 84 tablet; Refill: 3        R/B/A of BC pills; counseled that vaping/smoking and COCs is not ideal due to concern for blood clots but not contraindicated until she is 35 years and older and smokes more than 15 cigarettes a day. Discussed Junel which has a lower amount of estrogen; may experience breakthrough bleeding for the first 3 months. Return if it persist past 3 months. Recommend pap smear after her 22st birthday, will schedule.

## 2022-08-01 RX ORDER — NORETHINDRONE ACETATE AND ETHINYL ESTRADIOL 1MG-20(21)
1 KIT ORAL DAILY
Qty: 84 TABLET | Refills: 3 | OUTPATIENT
Start: 2022-08-01 | End: 2023-08-01

## 2022-09-28 ENCOUNTER — INITIAL PRENATAL (OUTPATIENT)
Dept: OBSTETRICS AND GYNECOLOGY | Facility: CLINIC | Age: 22
End: 2022-09-28

## 2022-09-28 ENCOUNTER — LAB (OUTPATIENT)
Dept: LAB | Facility: HOSPITAL | Age: 22
End: 2022-09-28

## 2022-09-28 VITALS — WEIGHT: 245 LBS | SYSTOLIC BLOOD PRESSURE: 110 MMHG | DIASTOLIC BLOOD PRESSURE: 78 MMHG | BODY MASS INDEX: 38.37 KG/M2

## 2022-09-28 DIAGNOSIS — O36.80X0 ENCOUNTER TO DETERMINE FETAL VIABILITY OF PREGNANCY, SINGLE OR UNSPECIFIED FETUS: ICD-10-CM

## 2022-09-28 DIAGNOSIS — Z34.80 PRENATAL CARE OF MULTIGRAVIDA, ANTEPARTUM: Primary | ICD-10-CM

## 2022-09-28 DIAGNOSIS — Z98.891 HISTORY OF C-SECTION: ICD-10-CM

## 2022-09-28 DIAGNOSIS — Z36.87 UNSURE OF LMP (LAST MENSTRUAL PERIOD) AS REASON FOR ULTRASOUND SCAN: ICD-10-CM

## 2022-09-28 DIAGNOSIS — E66.9 OBESITY (BMI 30-39.9): ICD-10-CM

## 2022-09-28 LAB
ABO GROUP BLD: NORMAL
AMPHET+METHAMPHET UR QL: NEGATIVE
AMPHETAMINES UR QL: NEGATIVE
BARBITURATES UR QL SCN: NEGATIVE
BASOPHILS # BLD AUTO: 0.04 10*3/MM3 (ref 0–0.2)
BASOPHILS NFR BLD AUTO: 0.4 % (ref 0–1.5)
BENZODIAZ UR QL SCN: NEGATIVE
BILIRUB UR QL STRIP: NEGATIVE
BLD GP AB SCN SERPL QL: NEGATIVE
BUPRENORPHINE SERPL-MCNC: NEGATIVE NG/ML
CANNABINOIDS SERPL QL: NEGATIVE
CLARITY UR: ABNORMAL
COCAINE UR QL: NEGATIVE
COLOR UR: YELLOW
DEPRECATED RDW RBC AUTO: 43.5 FL (ref 37–54)
EOSINOPHIL # BLD AUTO: 0.21 10*3/MM3 (ref 0–0.4)
EOSINOPHIL NFR BLD AUTO: 2 % (ref 0.3–6.2)
ERYTHROCYTE [DISTWIDTH] IN BLOOD BY AUTOMATED COUNT: 13.5 % (ref 12.3–15.4)
GLUCOSE UR STRIP-MCNC: NEGATIVE MG/DL
HBV SURFACE AG SERPL QL IA: NORMAL
HCG INTACT+B SERPL-ACNC: NORMAL MIU/ML
HCT VFR BLD AUTO: 38 % (ref 34–46.6)
HCV AB SER DONR QL: NORMAL
HGB BLD-MCNC: 13 G/DL (ref 12–15.9)
HGB UR QL STRIP.AUTO: NEGATIVE
HIV1+2 AB SER QL: NORMAL
IMM GRANULOCYTES # BLD AUTO: 0.04 10*3/MM3 (ref 0–0.05)
IMM GRANULOCYTES NFR BLD AUTO: 0.4 % (ref 0–0.5)
KETONES UR QL STRIP: NEGATIVE
LEUKOCYTE ESTERASE UR QL STRIP.AUTO: ABNORMAL
LYMPHOCYTES # BLD AUTO: 2.58 10*3/MM3 (ref 0.7–3.1)
LYMPHOCYTES NFR BLD AUTO: 24 % (ref 19.6–45.3)
Lab: NORMAL
MCH RBC QN AUTO: 30.2 PG (ref 26.6–33)
MCHC RBC AUTO-ENTMCNC: 34.2 G/DL (ref 31.5–35.7)
MCV RBC AUTO: 88.2 FL (ref 79–97)
METHADONE UR QL SCN: NEGATIVE
MONOCYTES # BLD AUTO: 0.9 10*3/MM3 (ref 0.1–0.9)
MONOCYTES NFR BLD AUTO: 8.4 % (ref 5–12)
NEUTROPHILS NFR BLD AUTO: 6.99 10*3/MM3 (ref 1.7–7)
NEUTROPHILS NFR BLD AUTO: 64.8 % (ref 42.7–76)
NITRITE UR QL STRIP: NEGATIVE
NRBC BLD AUTO-RTO: 0 /100 WBC (ref 0–0.2)
OPIATES UR QL: NEGATIVE
OXYCODONE UR QL SCN: NEGATIVE
PCP UR QL SCN: NEGATIVE
PH UR STRIP.AUTO: 5.5 [PH] (ref 5–8)
PLATELET # BLD AUTO: 299 10*3/MM3 (ref 140–450)
PMV BLD AUTO: 10.8 FL (ref 6–12)
PROPOXYPH UR QL: NEGATIVE
PROT UR QL STRIP: ABNORMAL
RBC # BLD AUTO: 4.31 10*6/MM3 (ref 3.77–5.28)
RH BLD: POSITIVE
RPR SER QL: NORMAL
SP GR UR STRIP: 1.02 (ref 1–1.03)
TRICYCLICS UR QL SCN: NEGATIVE
UROBILINOGEN UR QL STRIP: ABNORMAL
WBC NRBC COR # BLD: 10.76 10*3/MM3 (ref 3.4–10.8)

## 2022-09-28 PROCEDURE — 80081 OBSTETRIC PANEL INC HIV TSTG: CPT | Performed by: NURSE PRACTITIONER

## 2022-09-28 PROCEDURE — 86803 HEPATITIS C AB TEST: CPT | Performed by: NURSE PRACTITIONER

## 2022-09-28 PROCEDURE — 87491 CHLMYD TRACH DNA AMP PROBE: CPT | Performed by: NURSE PRACTITIONER

## 2022-09-28 PROCEDURE — 87661 TRICHOMONAS VAGINALIS AMPLIF: CPT | Performed by: NURSE PRACTITIONER

## 2022-09-28 PROCEDURE — 87086 URINE CULTURE/COLONY COUNT: CPT | Performed by: NURSE PRACTITIONER

## 2022-09-28 PROCEDURE — 36415 COLL VENOUS BLD VENIPUNCTURE: CPT

## 2022-09-28 PROCEDURE — 81003 URINALYSIS AUTO W/O SCOPE: CPT | Performed by: NURSE PRACTITIONER

## 2022-09-28 PROCEDURE — 80306 DRUG TEST PRSMV INSTRMNT: CPT | Performed by: NURSE PRACTITIONER

## 2022-09-28 PROCEDURE — 84702 CHORIONIC GONADOTROPIN TEST: CPT | Performed by: NURSE PRACTITIONER

## 2022-09-28 PROCEDURE — 87591 N.GONORRHOEAE DNA AMP PROB: CPT | Performed by: NURSE PRACTITIONER

## 2022-09-28 NOTE — PROGRESS NOTES
I spent approximately 40 minutes with the patient acquiring the health and history intake, reviewing prior records, discussing topics related to healthy pregnancy, entering orders, and documentation. She says she was on birth control pills at the time of conception. She plans to use an IUD after this delivery. She brings proof of pregnancy from the [facility]. Her LMP is approximately 7/27/22. This is her 3rd pregnancy. She had a csection with her son on 6/19/20 at 39 weeks and 6 days gestation. The pregnancy was complicated by polyhydramnios, chorioamnionitis, fetal tachycardia, and maternal fever. She had a repeat csection at 39 weeks and 5 days gestation on 8/4/21 with her daughter. Dr. Pimentel delivered both babies.   A newob bag is given today. The 1st trimester teaching was done with the patient. We discussed a healthy diet and exercise and what is recommended. I also discussed Listeriosis and Toxoplasmosis and what fish to avoid due to high mercury levels. I informed patient not to be in hot tubs, saunas, or tanning beds. We discussed that spotting may occur after intercourse which is common, but if heavy bleeding like a period occurs to [facility] or hospital if clinic is closed.  I encouraged her to make an appointment with the dentist if she has not had a dental exam and cleaning in the last 6 months. The patient currently denies alcohol, illicit drug use, and tobacco smoking. She plans to breastfeed. I talked to her about attending the breastfeeding class. She filled out the depression screening questionnaire and scored 1. I encouraged the patient to get the TDAP vaccine in the 3rd trimester.  I discussed with the patient that a pediatrician needs to be chosen prior to delivery for the infant to have an appointment scheduled before leaving the hospital. Her children see Rossana GREENBERG.  I discussed lab tests will be done today. She will need an early 1 hr glucola for BMI 37.  All questions were  answered at this time. She is scheduled for an ultrasound and to see Latricia GREENBERG on 10/10/22.

## 2022-09-29 ENCOUNTER — TELEPHONE (OUTPATIENT)
Dept: OBSTETRICS AND GYNECOLOGY | Facility: CLINIC | Age: 22
End: 2022-09-29

## 2022-09-29 LAB
BACTERIA SPEC AEROBE CULT: NORMAL
C TRACH RRNA CVX QL NAA+PROBE: POSITIVE
N GONORRHOEA RRNA SPEC QL NAA+PROBE: NEGATIVE
RUBV IGG SERPL IA-ACNC: 3.97 INDEX
TRICHOMONAS VAGINALIS PCR: NEGATIVE

## 2022-09-29 NOTE — TELEPHONE ENCOUNTER
----- Message from DIONICIO Cuellar sent at 9/29/2022  8:53 AM CDT -----  + Chlamydia. Please order 1gm Azithromycin and partner needs tx. Can send rx if we have his info. No sex for 7 days after both are treated. Retest in 3rd trimester.

## 2022-09-30 ENCOUNTER — TELEPHONE (OUTPATIENT)
Dept: OBSTETRICS AND GYNECOLOGY | Facility: CLINIC | Age: 22
End: 2022-09-30

## 2022-09-30 RX ORDER — AZITHROMYCIN 500 MG/1
1000 TABLET, FILM COATED ORAL ONCE
Qty: 2 TABLET | Refills: 0 | Status: SHIPPED | OUTPATIENT
Start: 2022-09-30 | End: 2022-09-30

## 2022-09-30 NOTE — TELEPHONE ENCOUNTER
Patient notified at this time and given all information rx sent in. Declined treatment to be sent in for partner

## 2022-10-10 ENCOUNTER — INITIAL PRENATAL (OUTPATIENT)
Dept: OBSTETRICS AND GYNECOLOGY | Facility: CLINIC | Age: 22
End: 2022-10-10

## 2022-10-10 VITALS — WEIGHT: 244 LBS | SYSTOLIC BLOOD PRESSURE: 104 MMHG | DIASTOLIC BLOOD PRESSURE: 60 MMHG | BODY MASS INDEX: 38.22 KG/M2

## 2022-10-10 DIAGNOSIS — A74.9 CHLAMYDIA INFECTION DURING PREGNANCY: Primary | ICD-10-CM

## 2022-10-10 DIAGNOSIS — Z98.891 HISTORY OF CESAREAN SECTION: ICD-10-CM

## 2022-10-10 DIAGNOSIS — Z3A.09 9 WEEKS GESTATION OF PREGNANCY: ICD-10-CM

## 2022-10-10 DIAGNOSIS — O99.211 OBESITY AFFECTING PREGNANCY IN FIRST TRIMESTER: ICD-10-CM

## 2022-10-10 DIAGNOSIS — O98.819 CHLAMYDIA INFECTION DURING PREGNANCY: Primary | ICD-10-CM

## 2022-10-10 PROBLEM — Z36.2 ENCOUNTER FOR OTHER ANTENATAL SCREENING FOLLOW-UP: Status: RESOLVED | Noted: 2021-03-22 | Resolved: 2022-10-10

## 2022-10-10 PROBLEM — Z98.890 POSTOPERATIVE STATE: Status: RESOLVED | Noted: 2020-08-13 | Resolved: 2022-10-10

## 2022-10-10 PROBLEM — O09.613 YOUNG PRIMIGRAVIDA IN THIRD TRIMESTER: Status: RESOLVED | Noted: 2019-11-27 | Resolved: 2022-10-10

## 2022-10-10 PROBLEM — O42.90 PROM (PREMATURE RUPTURE OF MEMBRANES): Status: RESOLVED | Noted: 2020-06-20 | Resolved: 2022-10-10

## 2022-10-10 PROBLEM — Z30.011 INITIATION OF ORAL CONTRACEPTION: Status: RESOLVED | Noted: 2020-10-08 | Resolved: 2022-10-10

## 2022-10-10 PROBLEM — Z87.59 HISTORY OF POLYHYDRAMNIOS: Status: RESOLVED | Noted: 2021-05-05 | Resolved: 2022-10-10

## 2022-10-10 PROBLEM — O41.1230 CHORIOAMNIONITIS IN THIRD TRIMESTER: Status: RESOLVED | Noted: 2020-06-19 | Resolved: 2022-10-10

## 2022-10-10 PROBLEM — Z36.2: Status: RESOLVED | Noted: 2021-03-22 | Resolved: 2022-10-10

## 2022-10-10 PROCEDURE — 99214 OFFICE O/P EST MOD 30 MIN: CPT | Performed by: NURSE PRACTITIONER

## 2022-10-10 RX ORDER — FOLIC ACID 1 MG/1
1 TABLET ORAL DAILY
Qty: 30 TABLET | Refills: 8 | Status: SHIPPED | OUTPATIENT
Start: 2022-10-10

## 2022-10-10 NOTE — PROGRESS NOTES
[facility]  Obstetrics  Date of Service: 10/10/2022    CHIEF COMPLAINT:  New prenatal visit    HISTORY OF PRESENT ILLNESS:  Ramesh Middleton is a 21 y.o. y/o  at 9w1d by LMP (Patient's last menstrual period was 2022 (approximate).).  This was an unplanned pregnancy and the patient is alone today.  Reports no nausea without vomiting.  Reports breast tenderness.  She denies any vaginal bleeding.  She has  started taking a prenatal vitamin.    REVIEW OF SYSTEMS  Review of Systems   Constitutional: Negative for activity change, appetite change, diaphoresis, fatigue, unexpected weight gain and unexpected weight loss.   Respiratory: Negative for chest tightness and shortness of breath.    Cardiovascular: Negative for chest pain and palpitations.   Gastrointestinal: Negative for abdominal distention, abdominal pain, constipation, diarrhea, nausea and vomiting.   Genitourinary: Negative for amenorrhea, breast discharge, breast lump, breast pain, decreased libido, dyspareunia, dysuria, menstrual problem, pelvic pain, vaginal bleeding, vaginal discharge and vaginal pain.   Musculoskeletal: Negative for myalgias.   Skin: Negative for color change, dry skin and skin lesions.   Neurological: Negative for light-headedness and headache.   Psychiatric/Behavioral: Negative for agitation, dysphoric mood, sleep disturbance, depressed mood and stress. The patient is not nervous/anxious.        PRENATAL RISK FACTORS   Problems (from 22 to present)     Problem Noted Resolved    History of  section 2021 by Evangelista Pimentel MD No    Obesity affecting pregnancy in first trimester 3/10/2020 by Rosario Rowe APRN No    Chlamydia infection during pregnancy 12/10/2019 by Rosario Rowe APRN No    Overview Addendum 2021 12:08 PM by Rosenda Jaime APRN     MAO negative; repeat screening on                DATING CRITERIA:  LMP (  irregular) -- [facility] 5/3/23  1TUS (10/10 at 9w1d) -- CH[facility]HI 23    OBSTETRIC HISTORY:  OB History    Para Term  AB Living   3 2 2     2   SAB IAB Ectopic Molar Multiple Live Births           0 2      # Outcome Date GA Lbr Miguel/2nd Weight Sex Delivery Anes PTL Lv   3 Current            2 Term 22 39w5d  3459 g (7 lb 10 oz) F CS-LTranv Spinal  ROSE   1 Term 21 39w6d  4260 g (9 lb 6.3 oz) M CS-LTranv EPI, Gen N ROSE      Complications: Failure to Progress in First Stage     GYN HISTORY:  Denies h/o sexually transmitted infections/pelvic inflammatory disease  Denies h/o abnormal pap smears  Last pap smear:   Last Completed Pap Smear     This patient has no relevant Health Maintenance data.        Denies h/o gynecologic surgeries, including biopsies of the cervix    PAST MEDICAL HISTORY:  Past Medical History:   Diagnosis Date   • Chlamydia    • History of chorioamnionitis      PAST SURGICAL HISTORY:  Past Surgical History:   Procedure Laterality Date   •  SECTION N/A 2020    Procedure:  SECTION PRIMARY;  Surgeon: Evangelista Pimentel MD;  Location: [facility] LABOR DELIVERY;  Service: Obstetrics/Gynecology;  Laterality: N/A;   •  SECTION  2020   •  SECTION N/A 2021    Procedure:  SECTION REPEAT;  Surgeon: Evangelista Pimentel MD;  Location: [facility] LABOR DELIVERY;  Service: Obstetrics/Gynecology;  Laterality: N/A;     FAMILY HISTORY:  Family History   Problem Relation Age of Onset   • Lupus Mother    • Cancer Mother    • Fibromyalgia Mother    • No Known Problems Brother    • Asthma Brother    • No Known Problems Daughter    • No Known Problems Son      SOCIAL HISTORY:  Social History     Socioeconomic History   • Marital status: Single   Tobacco Use   • Smoking status: Former     Types: Electronic Cigarette   • Smokeless tobacco: Never   Vaping Use   • Vaping Use: Former   Substance and Sexual Activity   • Alcohol use: No   • Drug use: Not Currently      Types: Marijuana   • Sexual activity: Yes     Partners: Male     Birth control/protection: Condom     GENETIC SCREENING:  Age >36 yo as of CHICHI: no  Thalassemia: no  NTD: no  CHD: no  Down Syndrome/MR/Fragile X/Autism: no  Ashkenazi Voodoo with Truman-Sachs, [place], familial dysautonomia: no  Sickle cell disease or trait: no  Hemophilia: no  Muscular dystrophy: no  Cystic fibrosis: no  Funmi's chorea: no  Birth defects: no  Genetic/chromosomal disorders: no    INFECTION HISTORY:  TB exposure: no  HSV: no  Illness since LMP: no  Prior GBS infected child: no  STIs: chlamydia    ALLERGIES:  Allergies   Allergen Reactions   • Adhesive Tape Rash       MEDICATIONS:  Prior to Admission medications    Medication Sig Start Date End Date Taking? Authorizing Provider   folic acid (FOLVITE) 1 MG tablet Take 1 tablet by mouth Daily. 10/10/22   Latricia Orozco, DIONICIO       PHYSICAL EXAM:   /60   Wt 111 kg (244 lb)   LMP 2022 (Approximate)   BMI 38.22 kg/m²   General: Alert, healthy, no distress, well nourished and well developed.  Neurologic: Alert, oriented to person, place, and time.  Gait normal.  Cranial nerves II-XII grossly intact.  HEENT: Normocephalic, atraumatic.  Extraocular muscles intact, pupils equal and reactive x2.    Teeth: Normal hygiene.  Neck: Supple, no adenopathy, thyroid normal size, non-tender, without nodularity, trachea midline.  Breasts: Deferred  Lungs: Normal respiratory effort.  Clear to auscultation bilaterally.  No wheezes, rhonci, or rales.  Heart: Regular rate and rhythm.  No murmer, rub or gallop.  Abdomen: Soft, non-tender, non-distended,no masses, no hepatosplenomegaly, no hernia.  Skin: No rash, no lesions.  Extremities: No cyanosis, clubbing or edema.  PELVIC EXAM:  Deferred    IMPRESSION:  Ramesh Middleton is a 21 y.o.  at 9w1d for a new prenatal visit.    PLAN:  1.  IUP at 9w1d  - Options counseling performed and patient desires continuation of pregnancy to term    - Prenatal labs ordered  - Genetic testing, including cystic fibrosis, was discussed and patient considering  - Continue prenatal vitamins  - Weight gain counseling performed.   - Pregravid BMI >30: Recommend 11-20 lb  - Return to clinic in 4 weeks for return prenatal visit  - Reviewed COVID-19 visitation policy  - Reviewed COVID-19 precautions     Diagnosis Plan   1. Chlamydia infection during pregnancy  Completed tx. Needs MAO next visit      2. Obesity affecting pregnancy in first trimester  Early glucola next visit.      3. History of  section  Plans for repeat      4. 9 weeks gestation of pregnancy          Latricia Orozco, APRN  10/10/2022  08:49 CDT

## 2022-10-14 ENCOUNTER — PATIENT MESSAGE (OUTPATIENT)
Dept: OBSTETRICS AND GYNECOLOGY | Facility: CLINIC | Age: 22
End: 2022-10-14

## 2022-10-14 DIAGNOSIS — R30.0 DYSURIA: Primary | ICD-10-CM

## 2022-10-17 NOTE — TELEPHONE ENCOUNTER
From: Ramesh Middleton  To: DIONICIO Evans  Sent: 10/14/2022 8:15 AM CDT  Subject: UTI help    Hello, I was wondering if there’s any way I could get medication for a UTI.

## 2022-10-23 ENCOUNTER — HOSPITAL ENCOUNTER (EMERGENCY)
Facility: HOSPITAL | Age: 22
Discharge: HOME | End: 2022-10-23
Attending: STUDENT IN AN ORGANIZED HEALTH CARE EDUCATION/TRAINING PROGRAM | Admitting: STUDENT IN AN ORGANIZED HEALTH CARE EDUCATION/TRAINING PROGRAM

## 2022-10-23 ENCOUNTER — APPOINTMENT (OUTPATIENT)
Dept: ULTRASOUND IMAGING | Facility: HOSPITAL | Age: 22
End: 2022-10-23

## 2022-10-23 VITALS
DIASTOLIC BLOOD PRESSURE: 74 MMHG | HEIGHT: 67 IN | OXYGEN SATURATION: 99 % | HEART RATE: 85 BPM | SYSTOLIC BLOOD PRESSURE: 119 MMHG | WEIGHT: 242 LBS | RESPIRATION RATE: 18 BRPM | TEMPERATURE: 98.3 F | BODY MASS INDEX: 37.98 KG/M2

## 2022-10-23 DIAGNOSIS — N39.0 URINARY TRACT INFECTION WITH HEMATURIA, SITE UNSPECIFIED: ICD-10-CM

## 2022-10-23 DIAGNOSIS — O41.8X10 SUBCHORIONIC HEMORRHAGE OF PLACENTA IN FIRST TRIMESTER, SINGLE OR UNSPECIFIED FETUS: ICD-10-CM

## 2022-10-23 DIAGNOSIS — R31.9 URINARY TRACT INFECTION WITH HEMATURIA, SITE UNSPECIFIED: ICD-10-CM

## 2022-10-23 DIAGNOSIS — O20.0 THREATENED MISCARRIAGE: ICD-10-CM

## 2022-10-23 DIAGNOSIS — O46.8X1 SUBCHORIONIC HEMORRHAGE OF PLACENTA IN FIRST TRIMESTER, SINGLE OR UNSPECIFIED FETUS: ICD-10-CM

## 2022-10-23 DIAGNOSIS — O20.9 VAGINAL BLEEDING AFFECTING EARLY PREGNANCY: Primary | ICD-10-CM

## 2022-10-23 LAB
ABO GROUP BLD: NORMAL
ANION GAP SERPL CALCULATED.3IONS-SCNC: 9 MMOL/L (ref 5–15)
BACTERIA UR QL AUTO: ABNORMAL /HPF
BASOPHILS # BLD AUTO: 0.03 10*3/MM3 (ref 0–0.2)
BASOPHILS NFR BLD AUTO: 0.3 % (ref 0–1.5)
BILIRUB UR QL STRIP: NEGATIVE
BUN SERPL-MCNC: 7 MG/DL (ref 6–20)
BUN/CREAT SERPL: 10.4 (ref 7–25)
CALCIUM SPEC-SCNC: 8.6 MG/DL (ref 8.6–10.5)
CHLORIDE SERPL-SCNC: 105 MMOL/L (ref 98–107)
CLARITY UR: ABNORMAL
CO2 SERPL-SCNC: 23 MMOL/L (ref 22–29)
COLOR UR: YELLOW
CREAT SERPL-MCNC: 0.67 MG/DL (ref 0.57–1)
DEPRECATED RDW RBC AUTO: 39 FL (ref 37–54)
EGFRCR SERPLBLD CKD-EPI 2021: 128.5 ML/MIN/1.73
EOSINOPHIL # BLD AUTO: 0.24 10*3/MM3 (ref 0–0.4)
EOSINOPHIL NFR BLD AUTO: 2.5 % (ref 0.3–6.2)
ERYTHROCYTE [DISTWIDTH] IN BLOOD BY AUTOMATED COUNT: 12.6 % (ref 12.3–15.4)
GLUCOSE SERPL-MCNC: 107 MG/DL (ref 65–99)
GLUCOSE UR STRIP-MCNC: NEGATIVE MG/DL
HCG INTACT+B SERPL-ACNC: NORMAL MIU/ML
HCT VFR BLD AUTO: 34.2 % (ref 34–46.6)
HGB BLD-MCNC: 12.2 G/DL (ref 12–15.9)
HGB UR QL STRIP.AUTO: ABNORMAL
HYALINE CASTS UR QL AUTO: ABNORMAL /LPF
IMM GRANULOCYTES # BLD AUTO: 0.03 10*3/MM3 (ref 0–0.05)
IMM GRANULOCYTES NFR BLD AUTO: 0.3 % (ref 0–0.5)
KETONES UR QL STRIP: ABNORMAL
LEUKOCYTE ESTERASE UR QL STRIP.AUTO: ABNORMAL
LYMPHOCYTES # BLD AUTO: 2.46 10*3/MM3 (ref 0.7–3.1)
LYMPHOCYTES NFR BLD AUTO: 26 % (ref 19.6–45.3)
MCH RBC QN AUTO: 30.6 PG (ref 26.6–33)
MCHC RBC AUTO-ENTMCNC: 35.7 G/DL (ref 31.5–35.7)
MCV RBC AUTO: 85.7 FL (ref 79–97)
MONOCYTES # BLD AUTO: 0.62 10*3/MM3 (ref 0.1–0.9)
MONOCYTES NFR BLD AUTO: 6.6 % (ref 5–12)
NEUTROPHILS NFR BLD AUTO: 6.07 10*3/MM3 (ref 1.7–7)
NEUTROPHILS NFR BLD AUTO: 64.3 % (ref 42.7–76)
NITRITE UR QL STRIP: NEGATIVE
NRBC BLD AUTO-RTO: 0 /100 WBC (ref 0–0.2)
PH UR STRIP.AUTO: 6.5 [PH] (ref 5–9)
PLATELET # BLD AUTO: 244 10*3/MM3 (ref 140–450)
PMV BLD AUTO: 10.7 FL (ref 6–12)
POTASSIUM SERPL-SCNC: 3.9 MMOL/L (ref 3.5–5.2)
PROT UR QL STRIP: ABNORMAL
RBC # BLD AUTO: 3.99 10*6/MM3 (ref 3.77–5.28)
RBC # UR STRIP: ABNORMAL /HPF
REF LAB TEST METHOD: ABNORMAL
RH BLD: POSITIVE
SODIUM SERPL-SCNC: 137 MMOL/L (ref 136–145)
SP GR UR STRIP: 1.02 (ref 1–1.03)
SQUAMOUS #/AREA URNS HPF: ABNORMAL /HPF
UROBILINOGEN UR QL STRIP: ABNORMAL
WBC # UR STRIP: ABNORMAL /HPF
WBC NRBC COR # BLD: 9.45 10*3/MM3 (ref 3.4–10.8)
WHOLE BLOOD HOLD COAG: NORMAL

## 2022-10-23 PROCEDURE — 99283 EMERGENCY DEPT VISIT LOW MDM: CPT

## 2022-10-23 PROCEDURE — 86901 BLOOD TYPING SEROLOGIC RH(D): CPT | Performed by: STUDENT IN AN ORGANIZED HEALTH CARE EDUCATION/TRAINING PROGRAM

## 2022-10-23 PROCEDURE — 80048 BASIC METABOLIC PNL TOTAL CA: CPT | Performed by: STUDENT IN AN ORGANIZED HEALTH CARE EDUCATION/TRAINING PROGRAM

## 2022-10-23 PROCEDURE — 85025 COMPLETE CBC W/AUTO DIFF WBC: CPT | Performed by: STUDENT IN AN ORGANIZED HEALTH CARE EDUCATION/TRAINING PROGRAM

## 2022-10-23 PROCEDURE — 87086 URINE CULTURE/COLONY COUNT: CPT | Performed by: STUDENT IN AN ORGANIZED HEALTH CARE EDUCATION/TRAINING PROGRAM

## 2022-10-23 PROCEDURE — 36415 COLL VENOUS BLD VENIPUNCTURE: CPT

## 2022-10-23 PROCEDURE — 86900 BLOOD TYPING SEROLOGIC ABO: CPT | Performed by: STUDENT IN AN ORGANIZED HEALTH CARE EDUCATION/TRAINING PROGRAM

## 2022-10-23 PROCEDURE — 84702 CHORIONIC GONADOTROPIN TEST: CPT | Performed by: STUDENT IN AN ORGANIZED HEALTH CARE EDUCATION/TRAINING PROGRAM

## 2022-10-23 PROCEDURE — 81001 URINALYSIS AUTO W/SCOPE: CPT

## 2022-10-23 PROCEDURE — 76817 TRANSVAGINAL US OBSTETRIC: CPT

## 2022-10-23 RX ORDER — CEPHALEXIN 500 MG/1
500 CAPSULE ORAL 2 TIMES DAILY
Qty: 10 CAPSULE | Refills: 0 | Status: SHIPPED | OUTPATIENT
Start: 2022-10-23 | End: 2022-10-28

## 2022-10-23 NOTE — ED PROVIDER NOTES
Subjective   History of Present Illness  21-year-old female comes to the ER approximately 11 weeks pregnant.  She is complaining of vaginal bleeding that is more than a period and abdominal cramping.  It started this morning.  It has been constant and the bleeding has been getting worse.  Cramping is rated a 7/10.    History provided by:  Patient   used: No        Review of Systems   Constitutional: Negative for chills and fever.   HENT: Negative for drooling.    Eyes: Negative for redness.   Respiratory: Negative for shortness of breath.    Cardiovascular: Negative for chest pain.   Gastrointestinal: Positive for abdominal pain. Negative for constipation, diarrhea, nausea and vomiting.   Genitourinary: Positive for vaginal bleeding. Negative for decreased urine volume, flank pain and vaginal discharge.   Skin: Negative for color change.   Neurological: Negative for seizures.   Psychiatric/Behavioral: Negative for confusion.       Past Medical History:   Diagnosis Date   • Chlamydia    • History of chorioamnionitis        Allergies   Allergen Reactions   • Adhesive Tape Rash       Past Surgical History:   Procedure Laterality Date   •  SECTION N/A 2020    Procedure:  SECTION PRIMARY;  Surgeon: Evangelista Pimentel MD;  Location: [facility] LABOR DELIVERY;  Service: Obstetrics/Gynecology;  Laterality: N/A;   •  SECTION  2020   •  SECTION N/A 2021    Procedure:  SECTION REPEAT;  Surgeon: Evangelista Pimentel MD;  Location: [facility] LABOR DELIVERY;  Service: Obstetrics/Gynecology;  Laterality: N/A;       Family History   Problem Relation Age of Onset   • Lupus Mother    • Cancer Mother    • Fibromyalgia Mother    • No Known Problems Brother    • Asthma Brother    • No Known Problems Daughter    • No Known Problems Son        Social History     Socioeconomic History   • Marital status: Single   Tobacco Use   • Smoking status: Former     Types: Electronic  "Cigarette   • Smokeless tobacco: Never   Vaping Use   • Vaping Use: Former   Substance and Sexual Activity   • Alcohol use: No   • Drug use: Not Currently     Types: Marijuana   • Sexual activity: Yes     Partners: Male     Birth control/protection: Condom           Objective    Vitals:    10/23/22 1150   BP: 119/74   BP Location: Right arm   Patient Position: Sitting   Pulse: 85   Resp: 18   Temp: 98.3 °F (36.8 °C)   TempSrc: Oral   SpO2: 99%   Weight: 110 kg (242 lb)   Height: 170.2 cm (67\")       Physical Exam  Vitals and nursing note reviewed.   Constitutional:       General: She is not in acute distress.     Appearance: She is well-developed. She is obese. She is not ill-appearing, toxic-appearing or diaphoretic.   HENT:      Head: Normocephalic.      Right Ear: External ear normal.      Left Ear: External ear normal.   Eyes:      Conjunctiva/sclera: Conjunctivae normal.   Pulmonary:      Effort: Pulmonary effort is normal. No accessory muscle usage or respiratory distress.   Chest:      Chest wall: No tenderness.   Abdominal:      Palpations: Abdomen is soft.      Tenderness: There is abdominal tenderness (deep palpation).   Skin:     General: Skin is warm and dry.      Capillary Refill: Capillary refill takes less than 2 seconds.   Neurological:      Mental Status: She is alert and oriented to person, place, and time.   Psychiatric:         Behavior: Behavior normal.         Procedures           ED Course      Results for orders placed or performed during the hospital encounter of 10/23/22   Urinalysis With Microscopic If Indicated (No Culture) - Urine, Clean Catch    Specimen: Urine, Clean Catch   Result Value Ref Range    Color, UA Yellow Yellow, Straw, Dark Yellow, Rimma    Appearance, UA Cloudy (A) Clear    pH, UA 6.5 5.0 - 9.0    Specific Gravity, UA 1.024 1.003 - 1.030    Glucose, UA Negative Negative    Ketones, UA Trace (A) Negative    Bilirubin, UA Negative Negative    Blood, UA Large (3+) (A) " Negative    Protein, UA Trace (A) Negative    Leuk Esterase, UA Moderate (2+) (A) Negative    Nitrite, UA Negative Negative    Urobilinogen, UA 1.0 E.U./dL 0.2 - 1.0 E.U./dL   Urinalysis, Microscopic Only - Urine, Clean Catch    Specimen: Urine, Clean Catch   Result Value Ref Range    RBC, UA 0-2 (A) None Seen /HPF    WBC, UA 21-30 (A) None Seen, 0-2, 3-5 /HPF    Bacteria, UA 2+ (A) None Seen /HPF    Squamous Epithelial Cells, UA 6-12 (A) None Seen, 0-2 /HPF    Hyaline Casts, UA None Seen None Seen /LPF    Methodology Manual Light Microscopy    Basic Metabolic Panel    Specimen: Blood   Result Value Ref Range    Glucose 107 (H) 65 - 99 mg/dL    BUN 7 6 - 20 mg/dL    Creatinine 0.67 0.57 - 1.00 mg/dL    Sodium 137 136 - 145 mmol/L    Potassium 3.9 3.5 - 5.2 mmol/L    Chloride 105 98 - 107 mmol/L    CO2 23.0 22.0 - 29.0 mmol/L    Calcium 8.6 8.6 - 10.5 mg/dL    BUN/Creatinine Ratio 10.4 7.0 - 25.0    Anion Gap 9.0 5.0 - 15.0 mmol/L    eGFR 128.5 >60.0 mL/min/1.73   hCG, Quantitative, Pregnancy    Specimen: Arm, Right; Blood   Result Value Ref Range    HCG Quantitative 137,544.00 mIU/mL   CBC Auto Differential    Specimen: Blood   Result Value Ref Range    WBC 9.45 3.40 - 10.80 10*3/mm3    RBC 3.99 3.77 - 5.28 10*6/mm3    Hemoglobin 12.2 12.0 - 15.9 g/dL    Hematocrit 34.2 34.0 - 46.6 %    MCV 85.7 79.0 - 97.0 fL    MCH 30.6 26.6 - 33.0 pg    MCHC 35.7 31.5 - 35.7 g/dL    RDW 12.6 12.3 - 15.4 %    RDW-SD 39.0 37.0 - 54.0 fl    MPV 10.7 6.0 - 12.0 fL    Platelets 244 140 - 450 10*3/mm3    Neutrophil % 64.3 42.7 - 76.0 %    Lymphocyte % 26.0 19.6 - 45.3 %    Monocyte % 6.6 5.0 - 12.0 %    Eosinophil % 2.5 0.3 - 6.2 %    Basophil % 0.3 0.0 - 1.5 %    Immature Grans % 0.3 0.0 - 0.5 %    Neutrophils, Absolute 6.07 1.70 - 7.00 10*3/mm3    Lymphocytes, Absolute 2.46 0.70 - 3.10 10*3/mm3    Monocytes, Absolute 0.62 0.10 - 0.90 10*3/mm3    Eosinophils, Absolute 0.24 0.00 - 0.40 10*3/mm3    Basophils, Absolute 0.03 0.00 - 0.20  10*3/mm3    Immature Grans, Absolute 0.03 0.00 - 0.05 10*3/mm3    nRBC 0.0 0.0 - 0.2 /100 WBC   ABO / Rh    Specimen: Blood   Result Value Ref Range    ABO Type O     RH type Positive    Light Blue Top   Result Value Ref Range    Extra Tube Hold for add-ons.      US Ob Transvaginal   Final Result   Viable 11 weeks four days single intrauterine gestation with   fetal heart motion of 174 BPM.   There is a subchorionic hemorrhage measuring 3.5 x 1.6 cm in   size.   Nonvisualization of the left ovary.   Unremarkable right ovary.      Electronically signed by:  Zeferino Huggins MD  10/23/2022 2:05 PM CDT   Workstation: [id number]                                             MDM  Number of Diagnoses or Management Options  Subchorionic hemorrhage of placenta in first trimester, single or unspecified fetus: new and requires workup  Threatened miscarriage: new and requires workup  Urinary tract infection with hematuria, site unspecified: new and requires workup  Vaginal bleeding affecting early pregnancy: new and requires workup  Diagnosis management comments: Vital signs are stable, afebrile.  Ultrasound shows a subchorionic hemorrhage with a live intrauterine pregnancy.  Labs significant for 2+ leukocytes, nitrate negative and UA.  Culture is pending.  We will start the patient on antibiotics.  Recommend follow-up with her PCP and OB.  Return precautions given.  Patient states understanding and is agreeable to the plan.      Final diagnoses:   Vaginal bleeding affecting early pregnancy   Subchorionic hemorrhage of placenta in first trimester, single or unspecified fetus   Urinary tract infection with hematuria, site unspecified   Threatened miscarriage       ED Disposition  ED Disposition     ED Disposition   Discharge    Condition   Stable    Comment   --             Victor Manuel Tam MD  [address] [place] [id number]  [phone]    Schedule an appointment as soon as possible for a visit in 2 days  ER follow  up    [facility]  [address]  [address]  [facility] [address] [phone]  Schedule an appointment as soon as possible for a visit in 2 days  ER follow up         Medication List      New Prescriptions    cephalexin 500 MG capsule  Commonly known as: KEFLEX  Take 1 capsule by mouth 2 (Two) Times a Day for 5 days.           Where to Get Your Medications      These medications were sent to [id number] - [place], [place] - [address] AT [address] [facility] [address] - [phone]  - [phone]   [address] [address]    Phone: [phone]   · cephalexin 500 MG capsule          Vamsi Mandel MD  10/23/22 1584

## 2022-10-24 ENCOUNTER — ROUTINE PRENATAL (OUTPATIENT)
Dept: OBSTETRICS AND GYNECOLOGY | Facility: CLINIC | Age: 22
End: 2022-10-24

## 2022-10-24 VITALS — SYSTOLIC BLOOD PRESSURE: 116 MMHG | BODY MASS INDEX: 38.53 KG/M2 | DIASTOLIC BLOOD PRESSURE: 70 MMHG | WEIGHT: 246 LBS

## 2022-10-24 DIAGNOSIS — O41.8X10 SUBCHORIONIC HEMATOMA IN FIRST TRIMESTER, SINGLE OR UNSPECIFIED FETUS: ICD-10-CM

## 2022-10-24 DIAGNOSIS — A74.9 CHLAMYDIA INFECTION DURING PREGNANCY: ICD-10-CM

## 2022-10-24 DIAGNOSIS — O99.211 OBESITY AFFECTING PREGNANCY IN FIRST TRIMESTER: ICD-10-CM

## 2022-10-24 DIAGNOSIS — O98.819 CHLAMYDIA INFECTION DURING PREGNANCY: ICD-10-CM

## 2022-10-24 DIAGNOSIS — O46.8X1 SUBCHORIONIC HEMATOMA IN FIRST TRIMESTER, SINGLE OR UNSPECIFIED FETUS: ICD-10-CM

## 2022-10-24 DIAGNOSIS — Z98.891 HISTORY OF CESAREAN SECTION: ICD-10-CM

## 2022-10-24 DIAGNOSIS — Z3A.11 11 WEEKS GESTATION OF PREGNANCY: Primary | ICD-10-CM

## 2022-10-24 LAB — BACTERIA SPEC AEROBE CULT: NORMAL

## 2022-10-24 PROCEDURE — 99213 OFFICE O/P EST LOW 20 MIN: CPT | Performed by: NURSE PRACTITIONER

## 2022-10-24 NOTE — PROGRESS NOTES
CC: Prenatal visit    Ramesh Middleton is a 21 y.o.  at 11w1d.  Doing well.  Denies contractions, LOF, or VB.  She was recently seen in the ER for vaginal bleeding. She was found to have a possible UTI and placed on antibiotic. TVUS noted subchorionic hemorrhage 3.5x1.6cm. Bleeding has since stopped.    /70   Wt 112 kg (246 lb)   LMP 2022 (Approximate)   BMI 38.53 kg/m²              Problems (from 22 to present)     Problem Noted Resolved    History of  section 2021 by Evangelista Pimentel MD No    Obesity affecting pregnancy in first trimester 3/10/2020 by Rosario Rowe APRN No    Chlamydia infection during pregnancy 12/10/2019 by Rosario Rowe APRN No    Overview Addendum 2021 12:08 PM by Rosenda Jaime APRN     MAO negative; repeat screening on                A/P: Ramesh Middleton is a 21 y.o.  at 11w1d.  - Keep next appointment  - Needs MAO for chlamydia   - Reviewed COVID-19 visitation policy  - Reviewed COVID-19 precautions     Diagnosis Plan   1. 11 weeks gestation of pregnancy        2. Obesity affecting pregnancy in first trimester        3. Chlamydia infection during pregnancy        4. History of  section        I attest and reviewed note documented per DIONICIO Barnes.       Scribed for DIONICIO Rg by @Thalia De La Cruz@. 10/24/2022    DIONICIO Barnes  10/24/2022  11:25 CDT

## 2022-11-07 ENCOUNTER — ROUTINE PRENATAL (OUTPATIENT)
Dept: OBSTETRICS AND GYNECOLOGY | Facility: CLINIC | Age: 22
End: 2022-11-07

## 2022-11-07 ENCOUNTER — LAB (OUTPATIENT)
Dept: LAB | Facility: HOSPITAL | Age: 22
End: 2022-11-07

## 2022-11-07 VITALS — BODY MASS INDEX: 38.53 KG/M2 | DIASTOLIC BLOOD PRESSURE: 72 MMHG | SYSTOLIC BLOOD PRESSURE: 112 MMHG | WEIGHT: 246 LBS

## 2022-11-07 DIAGNOSIS — A74.9 CHLAMYDIA INFECTION DURING PREGNANCY: ICD-10-CM

## 2022-11-07 DIAGNOSIS — Z3A.13 13 WEEKS GESTATION OF PREGNANCY: Primary | ICD-10-CM

## 2022-11-07 DIAGNOSIS — O99.211 OBESITY AFFECTING PREGNANCY IN FIRST TRIMESTER: ICD-10-CM

## 2022-11-07 DIAGNOSIS — Z98.891 HISTORY OF CESAREAN SECTION: ICD-10-CM

## 2022-11-07 DIAGNOSIS — O98.819 CHLAMYDIA INFECTION DURING PREGNANCY: ICD-10-CM

## 2022-11-07 DIAGNOSIS — Z36.3 ENCOUNTER FOR ROUTINE SCREENING FOR FETAL MALFORMATION USING ULTRASOUND: ICD-10-CM

## 2022-11-07 DIAGNOSIS — O41.8X10 SUBCHORIONIC HEMATOMA IN FIRST TRIMESTER, SINGLE OR UNSPECIFIED FETUS: ICD-10-CM

## 2022-11-07 DIAGNOSIS — O46.8X1 SUBCHORIONIC HEMATOMA IN FIRST TRIMESTER, SINGLE OR UNSPECIFIED FETUS: ICD-10-CM

## 2022-11-07 LAB — GLUCOSE 1H P 100 G GLC PO SERPL-MCNC: 121 MG/DL (ref 65–139)

## 2022-11-07 PROCEDURE — 99213 OFFICE O/P EST LOW 20 MIN: CPT | Performed by: NURSE PRACTITIONER

## 2022-11-07 PROCEDURE — 36415 COLL VENOUS BLD VENIPUNCTURE: CPT

## 2022-11-07 PROCEDURE — 82950 GLUCOSE TEST: CPT

## 2022-11-07 PROCEDURE — 87491 CHLMYD TRACH DNA AMP PROBE: CPT

## 2022-11-07 PROCEDURE — 87591 N.GONORRHOEAE DNA AMP PROB: CPT

## 2022-11-07 PROCEDURE — 87661 TRICHOMONAS VAGINALIS AMPLIF: CPT

## 2022-11-07 NOTE — PROGRESS NOTES
CC: Prenatal visit    Ramesh Middleton is a 21 y.o.  at 13w1d.  Doing well.  Denies contractions, LOF, or VB.      /72   Wt 112 kg (246 lb)   LMP 2022 (Approximate)   BMI 38.53 kg/m²   SVE: NA        Problems (from 22 to present)     Problem Noted Resolved    Subchorionic hematoma in first trimester 10/24/2022 by Rosario Rowe APRN No    History of  section 2021 by Evangelista Pimentel MD No    Obesity affecting pregnancy in first trimester 3/10/2020 by Rosario Rowe APRN No    Chlamydia infection during pregnancy 12/10/2019 by Rosario Rowe APRN No                        A/P: Ramesh Middleton is a 21 y.o.  at 13w1d.  - RTC in 4 weeks, fetal anatomy scan in 8 weeks  - MAO chlamydia today   - Reviewed COVID-19 visitation policy  - Reviewed COVID-19 precautions     Diagnosis Plan   1. 13 weeks gestation of pregnancy        2. History of  section        3. Obesity affecting pregnancy in first trimester        4. Chlamydia infection during pregnancy        5. Subchorionic hematoma in first trimester, single or unspecified fetus        6. Encounter for routine screening for fetal malformation using ultrasound  US ob detail fetal anatomy single or first gestation            Scribed for DIONICIO Rg by DIONICIO Barnes. 2022  08:03 CST     I attest and reviewed note documented per DIONICIO Barnes.      DIONICIO Barnes  2022  08:03 CST

## 2022-12-05 ENCOUNTER — ROUTINE PRENATAL (OUTPATIENT)
Dept: OBSTETRICS AND GYNECOLOGY | Facility: CLINIC | Age: 22
End: 2022-12-05

## 2022-12-05 VITALS — SYSTOLIC BLOOD PRESSURE: 112 MMHG | WEIGHT: 252 LBS | BODY MASS INDEX: 39.47 KG/M2 | DIASTOLIC BLOOD PRESSURE: 68 MMHG

## 2022-12-05 DIAGNOSIS — O41.8X10 SUBCHORIONIC HEMATOMA IN FIRST TRIMESTER, SINGLE OR UNSPECIFIED FETUS: ICD-10-CM

## 2022-12-05 DIAGNOSIS — Z3A.17 17 WEEKS GESTATION OF PREGNANCY: Primary | ICD-10-CM

## 2022-12-05 DIAGNOSIS — Z98.891 HISTORY OF CESAREAN SECTION: ICD-10-CM

## 2022-12-05 DIAGNOSIS — O46.8X1 SUBCHORIONIC HEMATOMA IN FIRST TRIMESTER, SINGLE OR UNSPECIFIED FETUS: ICD-10-CM

## 2022-12-05 DIAGNOSIS — O98.819 CHLAMYDIA INFECTION DURING PREGNANCY: ICD-10-CM

## 2022-12-05 DIAGNOSIS — O09.92 HIGH-RISK PREGNANCY IN SECOND TRIMESTER: ICD-10-CM

## 2022-12-05 DIAGNOSIS — A74.9 CHLAMYDIA INFECTION DURING PREGNANCY: ICD-10-CM

## 2022-12-05 DIAGNOSIS — O99.211 OBESITY AFFECTING PREGNANCY IN FIRST TRIMESTER: ICD-10-CM

## 2022-12-05 PROCEDURE — 99212 OFFICE O/P EST SF 10 MIN: CPT | Performed by: NURSE PRACTITIONER

## 2022-12-05 NOTE — PROGRESS NOTES
CC: Prenatal visit    Ramesh Middleton is a 22 y.o.  at 17w1d.  Doing well.  No complaints.  Denies contractions, LOF, or VB.  Starting to feel FM.    /68   Wt 114 kg (252 lb)   LMP 2022 (Approximate)   BMI 39.47 kg/m²              Problems (from 22 to present)     Problem Noted Resolved    Subchorionic hematoma in first trimester 10/24/2022 by Rosario Rowe APRN No    History of  section 2021 by Evangelista Pimentel MD No    Obesity affecting pregnancy in first trimester 3/10/2020 by Rosario Rowe APRN No    Chlamydia infection during pregnancy 12/10/2019 by Rosario Rowe APRN No    Overview Addendum 2021 12:08 PM by Rosenda Jaime APRN     MAO negative; repeat screening on                A/P: Ramesh Middleton is a 22 y.o.  at 17w1d.  - RTC in 4 weeks for SATHISH appt and fetal anatomy scan      Diagnosis Plan   1. 17 weeks gestation of pregnancy        2. High-risk pregnancy in second trimester        3. History of  section        4. Obesity affecting pregnancy in first trimester        5. Chlamydia infection during pregnancy        6. Subchorionic hematoma in first trimester, single or unspecified fetus              DIONICIO Rg  2022  08:24 CST

## 2023-01-03 ENCOUNTER — LAB (OUTPATIENT)
Dept: LAB | Facility: HOSPITAL | Age: 23
End: 2023-01-03
Payer: COMMERCIAL

## 2023-01-03 ENCOUNTER — ROUTINE PRENATAL (OUTPATIENT)
Dept: OBSTETRICS AND GYNECOLOGY | Facility: CLINIC | Age: 23
End: 2023-01-03
Payer: COMMERCIAL

## 2023-01-03 VITALS — BODY MASS INDEX: 40.25 KG/M2 | SYSTOLIC BLOOD PRESSURE: 114 MMHG | DIASTOLIC BLOOD PRESSURE: 68 MMHG | WEIGHT: 257 LBS

## 2023-01-03 DIAGNOSIS — R30.0 DYSURIA: ICD-10-CM

## 2023-01-03 DIAGNOSIS — Z3A.21 21 WEEKS GESTATION OF PREGNANCY: ICD-10-CM

## 2023-01-03 DIAGNOSIS — A74.9 CHLAMYDIA INFECTION DURING PREGNANCY: ICD-10-CM

## 2023-01-03 DIAGNOSIS — O09.92 SUPERVISION OF HIGH RISK PREGNANCY IN SECOND TRIMESTER: Primary | ICD-10-CM

## 2023-01-03 DIAGNOSIS — O99.212 OBESITY AFFECTING PREGNANCY IN SECOND TRIMESTER: ICD-10-CM

## 2023-01-03 DIAGNOSIS — O98.819 CHLAMYDIA INFECTION DURING PREGNANCY: ICD-10-CM

## 2023-01-03 DIAGNOSIS — O34.219 PREVIOUS CESAREAN DELIVERY AFFECTING PREGNANCY: ICD-10-CM

## 2023-01-03 PROBLEM — Z09 POSTOPERATIVE FOLLOW-UP: Status: RESOLVED | Noted: 2020-07-02 | Resolved: 2023-01-03

## 2023-01-03 LAB
BACTERIA UR QL AUTO: ABNORMAL /HPF
BILIRUB UR QL STRIP: NEGATIVE
CLARITY UR: ABNORMAL
COLOR UR: YELLOW
GLUCOSE UR STRIP-MCNC: NEGATIVE MG/DL
HGB UR QL STRIP.AUTO: ABNORMAL
HYALINE CASTS UR QL AUTO: ABNORMAL /LPF
KETONES UR QL STRIP: NEGATIVE
LEUKOCYTE ESTERASE UR QL STRIP.AUTO: ABNORMAL
NITRITE UR QL STRIP: NEGATIVE
PH UR STRIP.AUTO: 6.5 [PH] (ref 5–8)
PROT UR QL STRIP: NEGATIVE
RBC # UR STRIP: ABNORMAL /HPF
REF LAB TEST METHOD: ABNORMAL
SP GR UR STRIP: 1.02 (ref 1–1.03)
SQUAMOUS #/AREA URNS HPF: ABNORMAL /HPF
UROBILINOGEN UR QL STRIP: ABNORMAL
WBC # UR STRIP: ABNORMAL /HPF

## 2023-01-03 PROCEDURE — 81001 URINALYSIS AUTO W/SCOPE: CPT

## 2023-01-03 PROCEDURE — 87088 URINE BACTERIA CULTURE: CPT

## 2023-01-03 PROCEDURE — 87186 SC STD MICRODIL/AGAR DIL: CPT

## 2023-01-03 PROCEDURE — 87086 URINE CULTURE/COLONY COUNT: CPT

## 2023-01-03 PROCEDURE — 99213 OFFICE O/P EST LOW 20 MIN: CPT | Performed by: OBSTETRICS & GYNECOLOGY

## 2023-01-03 NOTE — PROGRESS NOTES
CC: Prenatal visit    Ramesh Middleton is a 22 y.o.  at 21w2d.  Doing well.  Denies contractions, LOF, or VB.  Reports good FM.    /68   Wt 117 kg (257 lb)   LMP 2022 (Approximate)   BMI 40.25 kg/m²      Fetal Heart Rate: 148     Prelim US- EFW 434g w/ AC 43%ile, ANN 12.5 cm, cephalic, placenta posterior w/o previa, anatomy WNL, GIRL, CL 4.06 cm, R and L ovary WNL     Problems (from 22 to present)     Problem Noted Resolved    Supervision of high risk pregnancy in second trimester 2022 by Rosario Rowe APRN No    Previous  delivery affecting pregnancy 2021 by Evangelista Pimentel MD No    Overview Signed 1/3/2023  8:10 AM by Samra Barger MD     LTCS x2  Desires RLTCS         Obesity affecting pregnancy in second trimester 3/10/2020 by Rosario Rowe APRN No    Overview Signed 1/3/2023  8:11 AM by Samra Barger MD     Class II obesity, PG BMI 37.6         Chlamydia infection during pregnancy 12/10/2019 by Rosario Rowe APRN No    Overview Addendum 1/3/2023  8:12 AM by Samra Barger MD     Positive on ; MAO negative on              A/P: Ramesh Middleton is a 22 y.o.  at 21w2d.  - RTC in 4 weeks  - RTC in 8 weeks w/ 3T labs, Tdap, breastpump script  - Considering BTL; discussed that it is permanent and non-reversible.  Discussed risks of multiple c-sections including scar tissue, risk of injury, and abnormal placenta.  She will consider and discuss w/ her partner; we will discuss further at her 29 wk visit  - Reviewed COVID-19 visitation policy  - Reviewed COVID-19 precautions     Diagnosis Plan   1. Supervision of high risk pregnancy in second trimester  CBC (No Diff)    Glucose, Post 50 Gm Glucola      2. Previous  delivery affecting pregnancy        3. Obesity affecting pregnancy in second trimester        4. Chlamydia infection during  pregnancy        5. 21 weeks gestation of pregnancy        6. Dysuria  Urinalysis With Culture If Indicated - Urine, Clean Catch        Samra Barger MD  1/3/2023  08:53 CST

## 2023-01-05 LAB — BACTERIA SPEC AEROBE CULT: ABNORMAL

## 2023-01-05 RX ORDER — NITROFURANTOIN 25; 75 MG/1; MG/1
100 CAPSULE ORAL 2 TIMES DAILY
Qty: 14 CAPSULE | Refills: 0 | Status: SHIPPED | OUTPATIENT
Start: 2023-01-05 | End: 2023-01-12

## 2023-02-28 ENCOUNTER — ROUTINE PRENATAL (OUTPATIENT)
Dept: OBSTETRICS AND GYNECOLOGY | Facility: CLINIC | Age: 23
End: 2023-02-28
Payer: COMMERCIAL

## 2023-02-28 ENCOUNTER — LAB (OUTPATIENT)
Dept: LAB | Facility: HOSPITAL | Age: 23
End: 2023-02-28
Payer: COMMERCIAL

## 2023-02-28 VITALS — SYSTOLIC BLOOD PRESSURE: 116 MMHG | BODY MASS INDEX: 40.25 KG/M2 | WEIGHT: 257 LBS | DIASTOLIC BLOOD PRESSURE: 72 MMHG

## 2023-02-28 DIAGNOSIS — A74.9 CHLAMYDIA INFECTION DURING PREGNANCY: ICD-10-CM

## 2023-02-28 DIAGNOSIS — Z30.09 UNWANTED FERTILITY: ICD-10-CM

## 2023-02-28 DIAGNOSIS — Z3A.29 29 WEEKS GESTATION OF PREGNANCY: ICD-10-CM

## 2023-02-28 DIAGNOSIS — O34.219 PREVIOUS CESAREAN DELIVERY AFFECTING PREGNANCY: ICD-10-CM

## 2023-02-28 DIAGNOSIS — O09.92 SUPERVISION OF HIGH RISK PREGNANCY IN SECOND TRIMESTER: ICD-10-CM

## 2023-02-28 DIAGNOSIS — O09.93 SUPERVISION OF HIGH RISK PREGNANCY IN THIRD TRIMESTER: Primary | ICD-10-CM

## 2023-02-28 DIAGNOSIS — O98.819 CHLAMYDIA INFECTION DURING PREGNANCY: ICD-10-CM

## 2023-02-28 DIAGNOSIS — O99.213 OBESITY AFFECTING PREGNANCY IN THIRD TRIMESTER: ICD-10-CM

## 2023-02-28 LAB
DEPRECATED RDW RBC AUTO: 38.5 FL (ref 37–54)
ERYTHROCYTE [DISTWIDTH] IN BLOOD BY AUTOMATED COUNT: 12.8 % (ref 12.3–15.4)
GLUCOSE 1H P 100 G GLC PO SERPL-MCNC: 113 MG/DL (ref 65–139)
HCT VFR BLD AUTO: 30.2 % (ref 34–46.6)
HGB BLD-MCNC: 10.2 G/DL (ref 12–15.9)
MCH RBC QN AUTO: 28.5 PG (ref 26.6–33)
MCHC RBC AUTO-ENTMCNC: 33.8 G/DL (ref 31.5–35.7)
MCV RBC AUTO: 84.4 FL (ref 79–97)
PLATELET # BLD AUTO: 230 10*3/MM3 (ref 140–450)
PMV BLD AUTO: 10.9 FL (ref 6–12)
RBC # BLD AUTO: 3.58 10*6/MM3 (ref 3.77–5.28)
WBC NRBC COR # BLD: 10.6 10*3/MM3 (ref 3.4–10.8)

## 2023-02-28 PROCEDURE — 99213 OFFICE O/P EST LOW 20 MIN: CPT | Performed by: OBSTETRICS & GYNECOLOGY

## 2023-02-28 PROCEDURE — 82950 GLUCOSE TEST: CPT

## 2023-02-28 PROCEDURE — 85027 COMPLETE CBC AUTOMATED: CPT

## 2023-02-28 PROCEDURE — 36415 COLL VENOUS BLD VENIPUNCTURE: CPT

## 2023-02-28 NOTE — PROGRESS NOTES
CC: Prenatal visit    Ramesh Middleton is a 22 y.o.  at 29w2d.  Doing well.  Denies contractions, LOF, or VB.  Reports good FM.    /72   Wt 117 kg (257 lb)   LMP 2022 (Approximate)   BMI 40.25 kg/m²   Fundal Height (cm): 28 cm  Fetal Heart Rate: 148     Problems (from 22 to present)     Problem Noted Resolved    Supervision of high risk pregnancy in third trimester 2022 by Rosario Rowe APRN No    Previous  delivery affecting pregnancy 2021 by Evangelista Pimentel MD No    Overview Signed 1/3/2023  8:10 AM by Samra Barger MD     LTCS x2  Desires RLTCS         Obesity affecting pregnancy in third trimester 3/10/2020 by Rosario Rowe APRN No    Overview Signed 1/3/2023  8:11 AM by Samra Barger MD     Class II obesity, PG BMI 37.6         Chlamydia infection during pregnancy 12/10/2019 by Rosario Rowe APRN No    Overview Addendum 1/3/2023  8:12 AM by Samra Barger MD     Positive on ; MAO negative on              A/P: Ramesh Middleton is a 22 y.o.  at 29w2d.  - RTC in 2 weeks  - 3T labs today  - Tdap info given  - Breastpump script given  - BTL MA consents signed today  - Will schedule patient's  at her 33 wk visit  - Reviewed COVID-19 visitation policy  - Reviewed COVID-19 precautions     Diagnosis Plan   1. Supervision of high risk pregnancy in third trimester        2. Previous  delivery affecting pregnancy        3. Obesity affecting pregnancy in third trimester        4. Chlamydia infection during pregnancy        5. 29 weeks gestation of pregnancy          Samra Barger MD  2023  16:58 CST

## 2023-03-01 DIAGNOSIS — O99.013 ANEMIA DURING PREGNANCY IN THIRD TRIMESTER: Primary | ICD-10-CM

## 2023-03-14 ENCOUNTER — LAB (OUTPATIENT)
Dept: LAB | Facility: HOSPITAL | Age: 23
End: 2023-03-14
Payer: COMMERCIAL

## 2023-03-14 ENCOUNTER — ROUTINE PRENATAL (OUTPATIENT)
Dept: OBSTETRICS AND GYNECOLOGY | Facility: CLINIC | Age: 23
End: 2023-03-14
Payer: COMMERCIAL

## 2023-03-14 VITALS — SYSTOLIC BLOOD PRESSURE: 118 MMHG | BODY MASS INDEX: 42.44 KG/M2 | WEIGHT: 271 LBS | DIASTOLIC BLOOD PRESSURE: 64 MMHG

## 2023-03-14 DIAGNOSIS — O09.93 SUPERVISION OF HIGH RISK PREGNANCY IN THIRD TRIMESTER: ICD-10-CM

## 2023-03-14 DIAGNOSIS — Z30.09 UNWANTED FERTILITY: ICD-10-CM

## 2023-03-14 DIAGNOSIS — O34.219 PREVIOUS CESAREAN DELIVERY AFFECTING PREGNANCY: ICD-10-CM

## 2023-03-14 DIAGNOSIS — O99.713 PRURITUS OF PREGNANCY IN THIRD TRIMESTER: ICD-10-CM

## 2023-03-14 DIAGNOSIS — O99.213 OBESITY AFFECTING PREGNANCY IN THIRD TRIMESTER: ICD-10-CM

## 2023-03-14 DIAGNOSIS — Z3A.31 31 WEEKS GESTATION OF PREGNANCY: Primary | ICD-10-CM

## 2023-03-14 DIAGNOSIS — O99.013 ANEMIA DURING PREGNANCY IN THIRD TRIMESTER: ICD-10-CM

## 2023-03-14 DIAGNOSIS — A74.9 CHLAMYDIA INFECTION DURING PREGNANCY: ICD-10-CM

## 2023-03-14 DIAGNOSIS — O98.819 CHLAMYDIA INFECTION DURING PREGNANCY: ICD-10-CM

## 2023-03-14 DIAGNOSIS — L29.9 PRURITUS OF PREGNANCY IN THIRD TRIMESTER: ICD-10-CM

## 2023-03-14 LAB
FERRITIN SERPL-MCNC: 13.52 NG/ML (ref 13–150)
FOLATE SERPL-MCNC: 10.4 NG/ML (ref 4.78–24.2)
IRON 24H UR-MRATE: 40 MCG/DL (ref 37–145)
IRON SATN MFR SERPL: 6 % (ref 20–50)
TIBC SERPL-MCNC: 618 MCG/DL (ref 298–536)
TRANSFERRIN SERPL-MCNC: 415 MG/DL (ref 200–360)
VIT B12 BLD-MCNC: 465 PG/ML (ref 211–946)

## 2023-03-14 PROCEDURE — 82728 ASSAY OF FERRITIN: CPT

## 2023-03-14 PROCEDURE — 36415 COLL VENOUS BLD VENIPUNCTURE: CPT

## 2023-03-14 PROCEDURE — 99214 OFFICE O/P EST MOD 30 MIN: CPT | Performed by: NURSE PRACTITIONER

## 2023-03-14 PROCEDURE — 83540 ASSAY OF IRON: CPT

## 2023-03-14 PROCEDURE — 82746 ASSAY OF FOLIC ACID SERUM: CPT

## 2023-03-14 PROCEDURE — 82607 VITAMIN B-12: CPT | Performed by: OBSTETRICS & GYNECOLOGY

## 2023-03-14 PROCEDURE — 84466 ASSAY OF TRANSFERRIN: CPT

## 2023-03-14 RX ORDER — DIAPER,BRIEF,INFANT-TODD,DISP
EACH MISCELLANEOUS
Qty: 120 G | Refills: 1 | Status: SHIPPED | OUTPATIENT
Start: 2023-03-14 | End: 2023-04-04

## 2023-03-14 NOTE — PROGRESS NOTES
"CC: Prenatal visit    Ramesh Middleton is a 22 y.o.  at 31w2d.  Doing well.  She developed \"itchy\" rash of upper extremities and abdomen. No new soaps or detergents. No one else in home has rash.    Denies contractions, LOF, or VB.  Reports good FM.    /64   Wt 123 kg (271 lb)   LMP 2022 (Approximate)   BMI 42.44 kg/m²              Problems (from 22 to present)     Problem Noted Resolved    Unwanted fertility 2023 by Samra Barger MD No    Overview Signed 2023  4:59 PM by Samra Barger MD     BTL MA consents signed          Supervision of high risk pregnancy in third trimester 2022 by Rosario Rowe APRN No    Previous  delivery affecting pregnancy 2021 by Evangelista Pimentel MD No    Overview Signed 1/3/2023  8:10 AM by Samra Barger MD     LTCS x2  Desires RLTCS         Obesity affecting pregnancy in third trimester 3/10/2020 by Rosario Rowe APRN No    Overview Signed 1/3/2023  8:11 AM by Samra Barger MD     Class II obesity, PG BMI 37.6         Chlamydia infection during pregnancy 12/10/2019 by Rosario Rowe APRN No    Overview Addendum 1/3/2023  8:12 AM by Samra Barger MD     Positive on ; MAO negative on                A/P: Ramesh Middleton is a 22 y.o.  at 31w2d.  - RTC in 2 weeks    CC: Prenatal visit    Ramesh Middleton is a 22 y.o.  at 31w3d.  Doing well.  No complaints.  Denies contractions, LOF, or VB.  Reports good FM.    /64   Wt 123 kg (271 lb)   LMP 2022 (Approximate)   BMI 42.44 kg/m²     Hive like bumps on bilateral upper extremities and within stretch marks, differential dx PUPPP rash     Fetal Heart Rate: 140    9/22 Problems (from 22 to present)     Problem Noted Resolved    Unwanted fertility 2023 by Samra Barger MD No    " Overview Signed 2023  4:59 PM by Samra Barger MD     BTL MA consents signed          Supervision of high risk pregnancy in third trimester 2022 by Rosario Rowe APRN No    Previous  delivery affecting pregnancy 2021 by Evangelista Pimentel MD No    Overview Signed 1/3/2023  8:10 AM by Samra Barger MD     LTCS x2  Desires RLTCS         Obesity affecting pregnancy in third trimester 3/10/2020 by Rosario Rowe APRN No    Overview Signed 1/3/2023  8:11 AM by Samra Barger MD     Class II obesity, PG BMI 37.6         Chlamydia infection during pregnancy 12/10/2019 by Rosario Rowe APRN No    Overview Addendum 1/3/2023  8:12 AM by Samra Barger MD     Positive on ; MAO negative on                A/P: Ramesh Middleton is a 22 y.o.  at 31w2d.  - RTC in 2 weeks     Diagnosis Plan   1. 31 weeks gestation of pregnancy        2. Supervision of high risk pregnancy in third trimester        3. Previous  delivery affecting pregnancy        4. Obesity affecting pregnancy in third trimester        5. Chlamydia infection during pregnancy        6. Unwanted fertility        7. Pruritus of pregnancy in third trimester  hydrocortisone 1 % cream    Keep skin moisturized, benadryl at bedtime           DIONICIO Rg  3/15/2023  18:28 CDT      DIONICIO Rg  3/14/2023  14:35 CDT

## 2023-03-16 PROBLEM — O99.013 ANEMIA DURING PREGNANCY IN THIRD TRIMESTER: Status: ACTIVE | Noted: 2023-03-16

## 2023-03-16 RX ORDER — FERROUS SULFATE 325(65) MG
325 TABLET ORAL 2 TIMES DAILY
Qty: 60 TABLET | Refills: 9 | Status: SHIPPED | OUTPATIENT
Start: 2023-03-16

## 2023-03-21 ENCOUNTER — ROUTINE PRENATAL (OUTPATIENT)
Dept: OBSTETRICS AND GYNECOLOGY | Facility: CLINIC | Age: 23
End: 2023-03-21
Payer: COMMERCIAL

## 2023-03-21 VITALS — DIASTOLIC BLOOD PRESSURE: 64 MMHG | WEIGHT: 276 LBS | SYSTOLIC BLOOD PRESSURE: 110 MMHG | BODY MASS INDEX: 43.23 KG/M2

## 2023-03-21 DIAGNOSIS — Z30.09 UNWANTED FERTILITY: ICD-10-CM

## 2023-03-21 DIAGNOSIS — A74.9 CHLAMYDIA INFECTION DURING PREGNANCY: ICD-10-CM

## 2023-03-21 DIAGNOSIS — O99.213 OBESITY AFFECTING PREGNANCY IN THIRD TRIMESTER: ICD-10-CM

## 2023-03-21 DIAGNOSIS — O98.819 CHLAMYDIA INFECTION DURING PREGNANCY: ICD-10-CM

## 2023-03-21 DIAGNOSIS — Z3A.32 32 WEEKS GESTATION OF PREGNANCY: ICD-10-CM

## 2023-03-21 DIAGNOSIS — O09.93 SUPERVISION OF HIGH RISK PREGNANCY IN THIRD TRIMESTER: Primary | ICD-10-CM

## 2023-03-21 DIAGNOSIS — O99.013 ANEMIA DURING PREGNANCY IN THIRD TRIMESTER: ICD-10-CM

## 2023-03-21 DIAGNOSIS — O34.219 PREVIOUS CESAREAN DELIVERY AFFECTING PREGNANCY: ICD-10-CM

## 2023-03-21 PROCEDURE — 99213 OFFICE O/P EST LOW 20 MIN: CPT | Performed by: OBSTETRICS & GYNECOLOGY

## 2023-03-21 RX ORDER — OXYTOCIN 10 [USP'U]/ML
999 INJECTION, SOLUTION INTRAMUSCULAR; INTRAVENOUS ONCE
OUTPATIENT
Start: 2023-03-21

## 2023-03-21 RX ORDER — ONDANSETRON 4 MG/1
4 TABLET, FILM COATED ORAL EVERY 6 HOURS PRN
OUTPATIENT
Start: 2023-03-21

## 2023-03-21 RX ORDER — LIDOCAINE HYDROCHLORIDE 10 MG/ML
5 INJECTION, SOLUTION EPIDURAL; INFILTRATION; INTRACAUDAL; PERINEURAL AS NEEDED
OUTPATIENT
Start: 2023-03-21

## 2023-03-21 RX ORDER — KETOROLAC TROMETHAMINE 15 MG/ML
30 INJECTION, SOLUTION INTRAMUSCULAR; INTRAVENOUS ONCE
OUTPATIENT
Start: 2023-03-21 | End: 2023-03-21

## 2023-03-21 RX ORDER — SODIUM CHLORIDE 0.9 % (FLUSH) 0.9 %
3-10 SYRINGE (ML) INJECTION AS NEEDED
OUTPATIENT
Start: 2023-03-21

## 2023-03-21 RX ORDER — ACETAMINOPHEN 500 MG
1000 TABLET ORAL ONCE
OUTPATIENT
Start: 2023-03-21 | End: 2023-03-21

## 2023-03-21 RX ORDER — SODIUM CHLORIDE 0.9 % (FLUSH) 0.9 %
3 SYRINGE (ML) INJECTION EVERY 12 HOURS SCHEDULED
OUTPATIENT
Start: 2023-03-21

## 2023-03-21 RX ORDER — ONDANSETRON 2 MG/ML
4 INJECTION INTRAMUSCULAR; INTRAVENOUS EVERY 6 HOURS PRN
OUTPATIENT
Start: 2023-03-21

## 2023-03-21 RX ORDER — TRISODIUM CITRATE DIHYDRATE AND CITRIC ACID MONOHYDRATE 500; 334 MG/5ML; MG/5ML
30 SOLUTION ORAL ONCE
OUTPATIENT
Start: 2023-03-21 | End: 2023-03-21

## 2023-03-21 RX ORDER — OXYTOCIN 10 [USP'U]/ML
250 INJECTION, SOLUTION INTRAMUSCULAR; INTRAVENOUS CONTINUOUS
OUTPATIENT
Start: 2023-03-21 | End: 2023-03-21

## 2023-03-21 NOTE — PROGRESS NOTES
CC: Prenatal visit    Ramesh Middleton is a 22 y.o.  at 32w2d.  Doing well.  Denies contractions, LOF, or VB.  Reports good FM.    /64   Wt 125 kg (276 lb)   LMP 2022 (Approximate)   BMI 43.23 kg/m²   Fundal Height (cm): 34 cm  Fetal Heart Rate: 136     Problems (from 22 to present)     Problem Noted Resolved    Anemia during pregnancy in third trimester 3/16/2023 by Samra Barger MD No    Unwanted fertility 2023 by Samra Barger MD No    Overview Signed 2023  4:59 PM by Samra Barger MD     BTL MA consents signed          Supervision of high risk pregnancy in third trimester 2022 by Rosario Rowe APRN No    Previous  delivery affecting pregnancy 2021 by Evangelista Pimentel MD No    Overview Signed 1/3/2023  8:10 AM by Samra Barger MD     LTCS x2  Desires RLTCS         Obesity affecting pregnancy in third trimester 3/10/2020 by Rosario Rowe APRN No    Overview Signed 1/3/2023  8:11 AM by Samra Barger MD     Class II obesity, PG BMI 37.6         Chlamydia infection during pregnancy 12/10/2019 by Rosario Rowe APRN No    Overview Addendum 1/3/2023  8:12 AM by Samra Barger MD     Positive on ; MAO negative on              A/P: Ramesh Middleton is a 22 y.o.  at 32w2d.  - RTC in 2 weeks  - RTC in 4 weeks w/ GBS     Diagnosis Plan   1. Supervision of high risk pregnancy in third trimester        2. Unwanted fertility        3. Previous  delivery affecting pregnancy  Case Request    lidocaine PF 1% (XYLOCAINE) injection 5 mL    sodium chloride 0.9 % flush 3 mL    sodium chloride 0.9 % flush 3-10 mL    lactated ringers bolus 1,000 mL    acetaminophen (TYLENOL) tablet 1,000 mg    Sod Citrate-Citric Acid (BICITRA) solution 30 mL    oxytocin (PITOCIN) injection    oxytocin (PITOCIN)  injection    ketorolac (TORADOL) injection 30 mg    ondansetron (ZOFRAN) tablet 4 mg    ondansetron (ZOFRAN) injection 4 mg    ceFAZolin (ANCEF) 3 g in sodium chloride 0.9 % 100 mL IVPB    Case Request      4. Obesity affecting pregnancy in third trimester        5. Chlamydia infection during pregnancy        6. Anemia during pregnancy in third trimester        7. 32 weeks gestation of pregnancy          Samra Barger MD  3/21/2023  17:38 CDT

## 2023-04-04 ENCOUNTER — ROUTINE PRENATAL (OUTPATIENT)
Dept: OBSTETRICS AND GYNECOLOGY | Facility: CLINIC | Age: 23
End: 2023-04-04
Payer: COMMERCIAL

## 2023-04-04 VITALS — SYSTOLIC BLOOD PRESSURE: 104 MMHG | BODY MASS INDEX: 44.01 KG/M2 | DIASTOLIC BLOOD PRESSURE: 68 MMHG | WEIGHT: 281 LBS

## 2023-04-04 DIAGNOSIS — O34.219 PREVIOUS CESAREAN DELIVERY AFFECTING PREGNANCY: ICD-10-CM

## 2023-04-04 DIAGNOSIS — A74.9 CHLAMYDIA INFECTION DURING PREGNANCY: ICD-10-CM

## 2023-04-04 DIAGNOSIS — Z3A.34 34 WEEKS GESTATION OF PREGNANCY: Primary | ICD-10-CM

## 2023-04-04 DIAGNOSIS — O09.93 SUPERVISION OF HIGH RISK PREGNANCY IN THIRD TRIMESTER: ICD-10-CM

## 2023-04-04 DIAGNOSIS — O99.213 OBESITY AFFECTING PREGNANCY IN THIRD TRIMESTER: ICD-10-CM

## 2023-04-04 DIAGNOSIS — Z30.09 UNWANTED FERTILITY: ICD-10-CM

## 2023-04-04 DIAGNOSIS — O98.819 CHLAMYDIA INFECTION DURING PREGNANCY: ICD-10-CM

## 2023-04-04 DIAGNOSIS — O99.013 ANEMIA DURING PREGNANCY IN THIRD TRIMESTER: ICD-10-CM

## 2023-04-04 PROCEDURE — 99213 OFFICE O/P EST LOW 20 MIN: CPT

## 2023-04-04 NOTE — PROGRESS NOTES
CC: Prenatal visit    Ramesh Middleton is a 22 y.o.  at 34w2d.  Doing well.  Denies contractions, LOF, or VB.  Reports good FM. Reports more vaginal pressure. Denies any vaginal issues.     /68   Wt 127 kg (281 lb)   LMP 2022 (Approximate)   BMI 44.01 kg/m²   SVE: NA  Fundal Height (cm): 36 cm  Fetal Heart Rate: 140     Problems (from 22 to present)     Problem Noted Resolved    Anemia during pregnancy in third trimester 3/16/2023 by Samra Barger MD No    Unwanted fertility 2023 by Samra Barger MD No    Overview Signed 2023  4:59 PM by Samra Barger MD     BTL MA consents signed          Supervision of high risk pregnancy in third trimester 2022 by Rosario Rowe APRN No    Previous  delivery affecting pregnancy 2021 by Evangelista Pimentel MD No    Overview Signed 1/3/2023  8:10 AM by Samra Barger MD     LTCS x2  Desires RLTCS         Obesity affecting pregnancy in third trimester 3/10/2020 by Rosario Rowe APRN No    Overview Signed 1/3/2023  8:11 AM by Samra Barger MD     Class II obesity, PG BMI 37.6         Chlamydia infection during pregnancy 12/10/2019 by Rosario Rowe APRN No    Overview Addendum 1/3/2023  8:12 AM by Samra Barger MD     Positive on ; MAO negative on                A/P: Ramesh Middleton is a 22 y.o.  at 34w2d.  - RTC in 2 weeks with GBS      Diagnosis Plan   1. 34 weeks gestation of pregnancy        2. Unwanted fertility        3. Supervision of high risk pregnancy in third trimester        4. Previous  delivery affecting pregnancy        5. Obesity affecting pregnancy in third trimester        6. Chlamydia infection during pregnancy        7. Anemia during pregnancy in third trimester          DIONICIO Barnes  2023  13:51 CDT

## 2023-04-18 ENCOUNTER — ROUTINE PRENATAL (OUTPATIENT)
Dept: OBSTETRICS AND GYNECOLOGY | Facility: CLINIC | Age: 23
End: 2023-04-18
Payer: COMMERCIAL

## 2023-04-18 VITALS — WEIGHT: 279.4 LBS | BODY MASS INDEX: 43.76 KG/M2 | SYSTOLIC BLOOD PRESSURE: 120 MMHG | DIASTOLIC BLOOD PRESSURE: 80 MMHG

## 2023-04-18 DIAGNOSIS — A74.9 CHLAMYDIA INFECTION DURING PREGNANCY: ICD-10-CM

## 2023-04-18 DIAGNOSIS — O34.219 PREVIOUS CESAREAN DELIVERY AFFECTING PREGNANCY: ICD-10-CM

## 2023-04-18 DIAGNOSIS — Z30.09 UNWANTED FERTILITY: Primary | ICD-10-CM

## 2023-04-18 DIAGNOSIS — O99.213 OBESITY AFFECTING PREGNANCY IN THIRD TRIMESTER: ICD-10-CM

## 2023-04-18 DIAGNOSIS — O99.013 ANEMIA DURING PREGNANCY IN THIRD TRIMESTER: ICD-10-CM

## 2023-04-18 DIAGNOSIS — O98.819 CHLAMYDIA INFECTION DURING PREGNANCY: ICD-10-CM

## 2023-04-18 DIAGNOSIS — O09.93 SUPERVISION OF HIGH RISK PREGNANCY IN THIRD TRIMESTER: ICD-10-CM

## 2023-04-18 DIAGNOSIS — Z3A.36 36 WEEKS GESTATION OF PREGNANCY: ICD-10-CM

## 2023-04-18 PROCEDURE — 87653 STREP B DNA AMP PROBE: CPT | Performed by: STUDENT IN AN ORGANIZED HEALTH CARE EDUCATION/TRAINING PROGRAM

## 2023-04-18 NOTE — PROGRESS NOTES
CC: Prenatal visit    Ramesh Middleton is a 22 y.o.  at 36w2d.  Doing well.  Some pressure and cramping, no regular painful ctx. Denies contractions, LOF, or VB.  Reports good FM.    /80   Wt 127 kg (279 lb 6.4 oz)   LMP 2022 (Approximate)   BMI 43.76 kg/m²   SVE: defer  Fundal Height (cm): 37 cm  Fetal Heart Rate: 145      A/P: Ramesh Middleton is a 22 y.o.  at 36w2d.  - RTC in 1 weeks  - Reviewed COVID-19 visitation policy  - Reviewed COVID-19 precautions  - 1 hr 113, Hgb 10.2, Plt 230  - GBS today    Problem List Items Addressed This Visit        Genitourinary and Reproductive     Unwanted fertility - Primary    Overview     BTL MA consents signed             Gravid and     Chlamydia infection during pregnancy    Overview     Positive on ; MAO negative on          Obesity affecting pregnancy in third trimester    Overview     Class II obesity, PG BMI 37.6         Previous  delivery affecting pregnancy    Overview     LTCS x2  Desires RLTCS         Supervision of high risk pregnancy in third trimester       Hematology and Neoplasia    Anemia during pregnancy in third trimester   Other Visit Diagnoses     36 weeks gestation of pregnancy        Relevant Orders    Group B Strep (Molecular) - Swab, Vaginal/Rectum (Completed)             Diagnosis Plan   1. Unwanted fertility        2. Supervision of high risk pregnancy in third trimester        3. Previous  delivery affecting pregnancy        4. Obesity affecting pregnancy in third trimester        5. Chlamydia infection during pregnancy        6. Anemia during pregnancy in third trimester        7. 36 weeks gestation of pregnancy  Group B Strep (Molecular) - Swab, Vaginal/Rectum    Group B Strep (Molecular) - Swab, Vaginal/Rectum        Veronica Toro DO  2023  10:24 CDT

## 2023-04-19 LAB — GROUP B STREP, DNA: NEGATIVE

## 2023-04-25 ENCOUNTER — ROUTINE PRENATAL (OUTPATIENT)
Dept: OBSTETRICS AND GYNECOLOGY | Facility: CLINIC | Age: 23
End: 2023-04-25
Payer: COMMERCIAL

## 2023-04-25 VITALS — DIASTOLIC BLOOD PRESSURE: 78 MMHG | BODY MASS INDEX: 44.79 KG/M2 | SYSTOLIC BLOOD PRESSURE: 116 MMHG | WEIGHT: 286 LBS

## 2023-04-25 DIAGNOSIS — O34.219 PREVIOUS CESAREAN DELIVERY AFFECTING PREGNANCY: ICD-10-CM

## 2023-04-25 DIAGNOSIS — Z3A.37 37 WEEKS GESTATION OF PREGNANCY: Primary | ICD-10-CM

## 2023-04-25 DIAGNOSIS — O99.213 OBESITY AFFECTING PREGNANCY IN THIRD TRIMESTER: ICD-10-CM

## 2023-04-25 DIAGNOSIS — O98.819 CHLAMYDIA INFECTION DURING PREGNANCY: ICD-10-CM

## 2023-04-25 DIAGNOSIS — Z30.09 UNWANTED FERTILITY: ICD-10-CM

## 2023-04-25 DIAGNOSIS — A74.9 CHLAMYDIA INFECTION DURING PREGNANCY: ICD-10-CM

## 2023-04-25 DIAGNOSIS — O99.013 ANEMIA DURING PREGNANCY IN THIRD TRIMESTER: ICD-10-CM

## 2023-04-25 DIAGNOSIS — O09.93 SUPERVISION OF HIGH RISK PREGNANCY IN THIRD TRIMESTER: ICD-10-CM

## 2023-04-25 NOTE — PROGRESS NOTES
CC: Prenatal visit    Ramesh Middleton is a 22 y.o.  at 37w2d.  Doing well.  Denies contractions, LOF, or VB.  Reports good FM. No issues or complaints today.     /78   Wt 130 kg (286 lb)   LMP 2022 (Approximate)   BMI 44.79 kg/m²   SVE: Declined   Fundal Height (cm): 39 cm  Fetal Heart Rate: 145     Problems (from 22 to present)     Problem Noted Resolved    Anemia during pregnancy in third trimester 3/16/2023 by Samra Barger MD No    Unwanted fertility 2023 by Samra Barger MD No    Overview Signed 2023  4:59 PM by Samra Barger MD     BTL MA consents signed          Supervision of high risk pregnancy in third trimester 2022 by Rosario Rowe APRN No    Previous  delivery affecting pregnancy 2021 by Evangelista Pimentel MD No    Overview Signed 1/3/2023  8:10 AM by Samra Barger MD     LTCS x2  Desires RLTCS         Obesity affecting pregnancy in third trimester 3/10/2020 by Rosario Rowe APRN No    Overview Signed 1/3/2023  8:11 AM by Samra Barger MD     Class II obesity, PG BMI 37.6         Chlamydia infection during pregnancy 12/10/2019 by Rosario Rowe APRN No    Overview Addendum 1/3/2023  8:12 AM by Samra Barger MD     Positive on ; MAO negative on                A/P: Ramesh Middleton is a 22 y.o.  at 37w2d.  - RTC in 1 weeks     Diagnosis Plan   1. 37 weeks gestation of pregnancy        2. Unwanted fertility        3. Supervision of high risk pregnancy in third trimester        4. Previous  delivery affecting pregnancy        5. Obesity affecting pregnancy in third trimester        6. Chlamydia infection during pregnancy        7. Anemia during pregnancy in third trimester          DIONICIO Barnes  2023  15:08 CDT

## 2023-05-08 ENCOUNTER — ANESTHESIA EVENT (OUTPATIENT)
Dept: LABOR AND DELIVERY | Facility: HOSPITAL | Age: 23
End: 2023-05-08
Payer: COMMERCIAL

## 2023-05-08 ENCOUNTER — ANESTHESIA (OUTPATIENT)
Dept: LABOR AND DELIVERY | Facility: HOSPITAL | Age: 23
End: 2023-05-08
Payer: COMMERCIAL

## 2023-05-08 ENCOUNTER — HOSPITAL ENCOUNTER (INPATIENT)
Facility: HOSPITAL | Age: 23
LOS: 1 days | Discharge: HOME | End: 2023-05-09
Attending: OBSTETRICS & GYNECOLOGY | Admitting: OBSTETRICS & GYNECOLOGY
Payer: COMMERCIAL

## 2023-05-08 DIAGNOSIS — O34.219 PREVIOUS CESAREAN DELIVERY AFFECTING PREGNANCY: ICD-10-CM

## 2023-05-08 DIAGNOSIS — Z98.891 STATUS POST CESAREAN DELIVERY: Primary | ICD-10-CM

## 2023-05-08 PROBLEM — Z90.79 STATUS POST BILATERAL SALPINGECTOMY: Status: ACTIVE | Noted: 2023-05-08

## 2023-05-08 LAB
ABO GROUP BLD: NORMAL
AMPHET+METHAMPHET UR QL: NEGATIVE
AMPHETAMINES UR QL: NEGATIVE
BARBITURATES UR QL SCN: NEGATIVE
BENZODIAZ UR QL SCN: NEGATIVE
BLD GP AB SCN SERPL QL: NEGATIVE
BUPRENORPHINE SERPL-MCNC: NEGATIVE NG/ML
CANNABINOIDS SERPL QL: NEGATIVE
COCAINE UR QL: NEGATIVE
DEPRECATED RDW RBC AUTO: 44.8 FL (ref 37–54)
ERYTHROCYTE [DISTWIDTH] IN BLOOD BY AUTOMATED COUNT: 15 % (ref 12.3–15.4)
FENTANYL UR-MCNC: NEGATIVE NG/ML
HCT VFR BLD AUTO: 31.2 % (ref 34–46.6)
HGB BLD-MCNC: 10.4 G/DL (ref 12–15.9)
Lab: NORMAL
MCH RBC QN AUTO: 27.2 PG (ref 26.6–33)
MCHC RBC AUTO-ENTMCNC: 33.3 G/DL (ref 31.5–35.7)
MCV RBC AUTO: 81.5 FL (ref 79–97)
METHADONE UR QL SCN: NEGATIVE
OPIATES UR QL: NEGATIVE
OXYCODONE UR QL SCN: NEGATIVE
PCP UR QL SCN: NEGATIVE
PLATELET # BLD AUTO: 185 10*3/MM3 (ref 140–450)
PMV BLD AUTO: 11.2 FL (ref 6–12)
PROPOXYPH UR QL: NEGATIVE
RBC # BLD AUTO: 3.83 10*6/MM3 (ref 3.77–5.28)
RH BLD: POSITIVE
T&S EXPIRATION DATE: NORMAL
TRICYCLICS UR QL SCN: NEGATIVE
WBC NRBC COR # BLD: 8.8 10*3/MM3 (ref 3.4–10.8)

## 2023-05-08 PROCEDURE — 25010000002 PHENYLEPHRINE 10 MG/ML SOLUTION: Performed by: NURSE ANESTHETIST, CERTIFIED REGISTERED

## 2023-05-08 PROCEDURE — S0260 H&P FOR SURGERY: HCPCS | Performed by: OBSTETRICS & GYNECOLOGY

## 2023-05-08 PROCEDURE — 25010000002 FENTANYL CITRATE (PF) 50 MCG/ML SOLUTION: Performed by: NURSE ANESTHETIST, CERTIFIED REGISTERED

## 2023-05-08 PROCEDURE — 25010000002 ROPIVACAINE PER 1 MG: Performed by: OBSTETRICS & GYNECOLOGY

## 2023-05-08 PROCEDURE — 85027 COMPLETE CBC AUTOMATED: CPT | Performed by: OBSTETRICS & GYNECOLOGY

## 2023-05-08 PROCEDURE — 58611 LIGATE OVIDUCT(S) ADD-ON: CPT | Performed by: OBSTETRICS & GYNECOLOGY

## 2023-05-08 PROCEDURE — 59515 CESAREAN DELIVERY: CPT | Performed by: OBSTETRICS & GYNECOLOGY

## 2023-05-08 PROCEDURE — 51703 INSERT BLADDER CATH COMPLEX: CPT

## 2023-05-08 PROCEDURE — 88302 TISSUE EXAM BY PATHOLOGIST: CPT

## 2023-05-08 PROCEDURE — 59025 FETAL NON-STRESS TEST: CPT

## 2023-05-08 PROCEDURE — 86901 BLOOD TYPING SEROLOGIC RH(D): CPT | Performed by: OBSTETRICS & GYNECOLOGY

## 2023-05-08 PROCEDURE — 0UB70ZZ EXCISION OF BILATERAL FALLOPIAN TUBES, OPEN APPROACH: ICD-10-PCS | Performed by: OBSTETRICS & GYNECOLOGY

## 2023-05-08 PROCEDURE — 86900 BLOOD TYPING SEROLOGIC ABO: CPT | Performed by: OBSTETRICS & GYNECOLOGY

## 2023-05-08 PROCEDURE — 25010000002 NALOXONE PER 1 MG

## 2023-05-08 PROCEDURE — 94760 N-INVAS EAR/PLS OXIMETRY 1: CPT

## 2023-05-08 PROCEDURE — 59514 CESAREAN DELIVERY ONLY: CPT | Performed by: SPECIALIST/TECHNOLOGIST, OTHER

## 2023-05-08 PROCEDURE — 59025 FETAL NON-STRESS TEST: CPT | Performed by: OBSTETRICS & GYNECOLOGY

## 2023-05-08 PROCEDURE — 86850 RBC ANTIBODY SCREEN: CPT | Performed by: OBSTETRICS & GYNECOLOGY

## 2023-05-08 PROCEDURE — 25010000002 METHYLERGONOVINE MALEATE PER 0.2 MG: Performed by: OBSTETRICS & GYNECOLOGY

## 2023-05-08 PROCEDURE — 88307 TISSUE EXAM BY PATHOLOGIST: CPT

## 2023-05-08 PROCEDURE — 25010000002 CEFAZOLIN PER 500 MG: Performed by: OBSTETRICS & GYNECOLOGY

## 2023-05-08 PROCEDURE — 25010000002 KETOROLAC TROMETHAMINE PER 15 MG: Performed by: NURSE ANESTHETIST, CERTIFIED REGISTERED

## 2023-05-08 PROCEDURE — 25010000002 OXYTOCIN PER 10 UNITS: Performed by: OBSTETRICS & GYNECOLOGY

## 2023-05-08 PROCEDURE — 80307 DRUG TEST PRSMV CHEM ANLYZR: CPT | Performed by: OBSTETRICS & GYNECOLOGY

## 2023-05-08 PROCEDURE — 58611 LIGATE OVIDUCT(S) ADD-ON: CPT | Performed by: SPECIALIST/TECHNOLOGIST, OTHER

## 2023-05-08 PROCEDURE — 94799 UNLISTED PULMONARY SVC/PX: CPT

## 2023-05-08 PROCEDURE — 25010000002 KETOROLAC TROMETHAMINE PER 15 MG: Performed by: OBSTETRICS & GYNECOLOGY

## 2023-05-08 PROCEDURE — 25010000002 MORPHINE PER 10 MG: Performed by: NURSE ANESTHETIST, CERTIFIED REGISTERED

## 2023-05-08 RX ORDER — SODIUM CHLORIDE, SODIUM LACTATE, POTASSIUM CHLORIDE, CALCIUM CHLORIDE 600; 310; 30; 20 MG/100ML; MG/100ML; MG/100ML; MG/100ML
INJECTION, SOLUTION INTRAVENOUS CONTINUOUS PRN
Status: DISCONTINUED | OUTPATIENT
Start: 2023-05-08 | End: 2023-05-08 | Stop reason: SURG

## 2023-05-08 RX ORDER — FENTANYL CITRATE 50 UG/ML
INJECTION, SOLUTION INTRAMUSCULAR; INTRAVENOUS AS NEEDED
Status: DISCONTINUED | OUTPATIENT
Start: 2023-05-08 | End: 2023-05-08 | Stop reason: SURG

## 2023-05-08 RX ORDER — OXYTOCIN/0.9 % SODIUM CHLORIDE 30/500 ML
999 PLASTIC BAG, INJECTION (ML) INTRAVENOUS ONCE
Status: COMPLETED | OUTPATIENT
Start: 2023-05-08 | End: 2023-05-08

## 2023-05-08 RX ORDER — SODIUM CHLORIDE 0.9 % (FLUSH) 0.9 %
3 SYRINGE (ML) INJECTION EVERY 12 HOURS SCHEDULED
Status: DISCONTINUED | OUTPATIENT
Start: 2023-05-08 | End: 2023-05-08 | Stop reason: HOSPADM

## 2023-05-08 RX ORDER — ONDANSETRON 2 MG/ML
4 INJECTION INTRAMUSCULAR; INTRAVENOUS EVERY 6 HOURS PRN
Status: DISCONTINUED | OUTPATIENT
Start: 2023-05-08 | End: 2023-05-09 | Stop reason: HOSPADM

## 2023-05-08 RX ORDER — ACETAMINOPHEN 500 MG
1000 TABLET ORAL ONCE
Status: COMPLETED | OUTPATIENT
Start: 2023-05-08 | End: 2023-05-08

## 2023-05-08 RX ORDER — OXYCODONE HYDROCHLORIDE 5 MG/1
5 TABLET ORAL EVERY 4 HOURS PRN
Status: DISCONTINUED | OUTPATIENT
Start: 2023-05-08 | End: 2023-05-09 | Stop reason: HOSPADM

## 2023-05-08 RX ORDER — DOCUSATE SODIUM 100 MG/1
100 CAPSULE, LIQUID FILLED ORAL 2 TIMES DAILY
Status: DISCONTINUED | OUTPATIENT
Start: 2023-05-08 | End: 2023-05-09 | Stop reason: HOSPADM

## 2023-05-08 RX ORDER — CARBOPROST TROMETHAMINE 250 UG/ML
250 INJECTION, SOLUTION INTRAMUSCULAR ONCE
Status: DISCONTINUED | OUTPATIENT
Start: 2023-05-08 | End: 2023-05-09 | Stop reason: HOSPADM

## 2023-05-08 RX ORDER — HYDROCORTISONE 25 MG/G
CREAM TOPICAL 3 TIMES DAILY PRN
Status: DISCONTINUED | OUTPATIENT
Start: 2023-05-08 | End: 2023-05-09 | Stop reason: HOSPADM

## 2023-05-08 RX ORDER — OXYTOCIN/0.9 % SODIUM CHLORIDE 30/500 ML
999 PLASTIC BAG, INJECTION (ML) INTRAVENOUS ONCE
Status: DISCONTINUED | OUTPATIENT
Start: 2023-05-08 | End: 2023-05-08 | Stop reason: HOSPADM

## 2023-05-08 RX ORDER — MISOPROSTOL 200 UG/1
600 TABLET ORAL ONCE
Status: COMPLETED | OUTPATIENT
Start: 2023-05-08 | End: 2023-05-08

## 2023-05-08 RX ORDER — OXYCODONE HYDROCHLORIDE 10 MG/1
10 TABLET ORAL EVERY 4 HOURS PRN
Status: DISCONTINUED | OUTPATIENT
Start: 2023-05-08 | End: 2023-05-09 | Stop reason: HOSPADM

## 2023-05-08 RX ORDER — CEFAZOLIN SODIUM IN 0.9 % NACL 3 G/100 ML
3 INTRAVENOUS SOLUTION, PIGGYBACK (ML) INTRAVENOUS ONCE
Status: COMPLETED | OUTPATIENT
Start: 2023-05-08 | End: 2023-05-08

## 2023-05-08 RX ORDER — ACETAMINOPHEN 500 MG
1000 TABLET ORAL EVERY 6 HOURS
Status: COMPLETED | OUTPATIENT
Start: 2023-05-08 | End: 2023-05-09

## 2023-05-08 RX ORDER — NALOXONE HCL 0.4 MG/ML
0.04 VIAL (ML) INJECTION
Status: DISCONTINUED | OUTPATIENT
Start: 2023-05-08 | End: 2023-05-09 | Stop reason: HOSPADM

## 2023-05-08 RX ORDER — KETOROLAC TROMETHAMINE 30 MG/ML
30 INJECTION, SOLUTION INTRAMUSCULAR; INTRAVENOUS ONCE
Status: DISCONTINUED | OUTPATIENT
Start: 2023-05-08 | End: 2023-05-08 | Stop reason: HOSPADM

## 2023-05-08 RX ORDER — BUPIVACAINE HYDROCHLORIDE 7.5 MG/ML
INJECTION, SOLUTION EPIDURAL; RETROBULBAR
Status: COMPLETED | OUTPATIENT
Start: 2023-05-08 | End: 2023-05-08

## 2023-05-08 RX ORDER — ONDANSETRON 4 MG/1
4 TABLET, FILM COATED ORAL EVERY 8 HOURS PRN
Status: DISCONTINUED | OUTPATIENT
Start: 2023-05-08 | End: 2023-05-09 | Stop reason: HOSPADM

## 2023-05-08 RX ORDER — MORPHINE SULFATE 1 MG/ML
INJECTION, SOLUTION EPIDURAL; INTRATHECAL; INTRAVENOUS AS NEEDED
Status: DISCONTINUED | OUTPATIENT
Start: 2023-05-08 | End: 2023-05-08 | Stop reason: SURG

## 2023-05-08 RX ORDER — OXYTOCIN/0.9 % SODIUM CHLORIDE 30/500 ML
250 PLASTIC BAG, INJECTION (ML) INTRAVENOUS CONTINUOUS
Status: ACTIVE | OUTPATIENT
Start: 2023-05-08 | End: 2023-05-08

## 2023-05-08 RX ORDER — PHENYLEPHRINE HYDROCHLORIDE 10 MG/ML
INJECTION INTRAVENOUS AS NEEDED
Status: DISCONTINUED | OUTPATIENT
Start: 2023-05-08 | End: 2023-05-08 | Stop reason: SURG

## 2023-05-08 RX ORDER — ONDANSETRON 2 MG/ML
4 INJECTION INTRAMUSCULAR; INTRAVENOUS ONCE
Status: DISCONTINUED | OUTPATIENT
Start: 2023-05-08 | End: 2023-05-09 | Stop reason: HOSPADM

## 2023-05-08 RX ORDER — ALUMINA, MAGNESIA, AND SIMETHICONE 2400; 2400; 240 MG/30ML; MG/30ML; MG/30ML
15 SUSPENSION ORAL EVERY 4 HOURS PRN
Status: DISCONTINUED | OUTPATIENT
Start: 2023-05-08 | End: 2023-05-09 | Stop reason: HOSPADM

## 2023-05-08 RX ORDER — ONDANSETRON 4 MG/1
4 TABLET, FILM COATED ORAL EVERY 6 HOURS PRN
Status: DISCONTINUED | OUTPATIENT
Start: 2023-05-08 | End: 2023-05-08 | Stop reason: HOSPADM

## 2023-05-08 RX ORDER — LIDOCAINE HYDROCHLORIDE 10 MG/ML
5 INJECTION, SOLUTION EPIDURAL; INFILTRATION; INTRACAUDAL; PERINEURAL AS NEEDED
Status: DISCONTINUED | OUTPATIENT
Start: 2023-05-08 | End: 2023-05-08 | Stop reason: HOSPADM

## 2023-05-08 RX ORDER — ONDANSETRON 2 MG/ML
4 INJECTION INTRAMUSCULAR; INTRAVENOUS EVERY 6 HOURS PRN
Status: DISCONTINUED | OUTPATIENT
Start: 2023-05-08 | End: 2023-05-08 | Stop reason: HOSPADM

## 2023-05-08 RX ORDER — ACETAMINOPHEN 500 MG
1000 TABLET ORAL EVERY 6 HOURS
Status: DISCONTINUED | OUTPATIENT
Start: 2023-05-09 | End: 2023-05-09 | Stop reason: HOSPADM

## 2023-05-08 RX ORDER — NALOXONE HYDROCHLORIDE 0.4 MG/ML
INJECTION, SOLUTION INTRAMUSCULAR; INTRAVENOUS; SUBCUTANEOUS
Status: COMPLETED
Start: 2023-05-08 | End: 2023-05-08

## 2023-05-08 RX ORDER — KETOROLAC TROMETHAMINE 30 MG/ML
30 INJECTION, SOLUTION INTRAMUSCULAR; INTRAVENOUS EVERY 6 HOURS
Status: COMPLETED | OUTPATIENT
Start: 2023-05-08 | End: 2023-05-09

## 2023-05-08 RX ORDER — SIMETHICONE 80 MG
80 TABLET,CHEWABLE ORAL 4 TIMES DAILY
Status: DISCONTINUED | OUTPATIENT
Start: 2023-05-08 | End: 2023-05-09 | Stop reason: HOSPADM

## 2023-05-08 RX ORDER — POLYETHYLENE GLYCOL 3350 17 G/17G
17 POWDER, FOR SOLUTION ORAL DAILY
Status: DISCONTINUED | OUTPATIENT
Start: 2023-05-08 | End: 2023-05-09 | Stop reason: HOSPADM

## 2023-05-08 RX ORDER — ROPIVACAINE HYDROCHLORIDE 5 MG/ML
INJECTION, SOLUTION EPIDURAL; INFILTRATION; PERINEURAL AS NEEDED
Status: DISCONTINUED | OUTPATIENT
Start: 2023-05-08 | End: 2023-05-09 | Stop reason: HOSPADM

## 2023-05-08 RX ORDER — IBUPROFEN 600 MG/1
600 TABLET ORAL EVERY 6 HOURS
Status: DISCONTINUED | OUTPATIENT
Start: 2023-05-09 | End: 2023-05-09 | Stop reason: HOSPADM

## 2023-05-08 RX ORDER — CALCIUM CARBONATE 200(500)MG
1 TABLET,CHEWABLE ORAL EVERY 4 HOURS PRN
Status: DISCONTINUED | OUTPATIENT
Start: 2023-05-08 | End: 2023-05-09 | Stop reason: HOSPADM

## 2023-05-08 RX ORDER — PRENATAL VIT/IRON FUM/FOLIC AC 27MG-0.8MG
1 TABLET ORAL DAILY
Status: DISCONTINUED | OUTPATIENT
Start: 2023-05-08 | End: 2023-05-09 | Stop reason: HOSPADM

## 2023-05-08 RX ORDER — TRISODIUM CITRATE DIHYDRATE AND CITRIC ACID MONOHYDRATE 500; 334 MG/5ML; MG/5ML
30 SOLUTION ORAL ONCE
Status: COMPLETED | OUTPATIENT
Start: 2023-05-08 | End: 2023-05-08

## 2023-05-08 RX ORDER — ROPIVACAINE HYDROCHLORIDE 5 MG/ML
30 INJECTION, SOLUTION EPIDURAL; INFILTRATION; PERINEURAL ONCE
Status: DISCONTINUED | OUTPATIENT
Start: 2023-05-08 | End: 2023-05-08

## 2023-05-08 RX ORDER — KETOROLAC TROMETHAMINE 30 MG/ML
INJECTION, SOLUTION INTRAMUSCULAR; INTRAVENOUS AS NEEDED
Status: DISCONTINUED | OUTPATIENT
Start: 2023-05-08 | End: 2023-05-08 | Stop reason: SURG

## 2023-05-08 RX ORDER — METHYLERGONOVINE MALEATE 0.2 MG/ML
200 INJECTION INTRAVENOUS ONCE AS NEEDED
Status: COMPLETED | OUTPATIENT
Start: 2023-05-08 | End: 2023-05-08

## 2023-05-08 RX ORDER — SODIUM CHLORIDE 0.9 % (FLUSH) 0.9 %
3-10 SYRINGE (ML) INJECTION AS NEEDED
Status: DISCONTINUED | OUTPATIENT
Start: 2023-05-08 | End: 2023-05-08 | Stop reason: HOSPADM

## 2023-05-08 RX ORDER — EPHEDRINE SULFATE 50 MG/ML
INJECTION, SOLUTION INTRAVENOUS AS NEEDED
Status: DISCONTINUED | OUTPATIENT
Start: 2023-05-08 | End: 2023-05-08 | Stop reason: SURG

## 2023-05-08 RX ADMIN — NALOXONE HYDROCHLORIDE 0.04 MG: 0.4 INJECTION, SOLUTION INTRAMUSCULAR; INTRAVENOUS; SUBCUTANEOUS at 10:07

## 2023-05-08 RX ADMIN — Medication 0.04 MG: at 10:07

## 2023-05-08 RX ADMIN — FENTANYL CITRATE 15 MCG: 50 INJECTION, SOLUTION INTRAMUSCULAR; INTRAVENOUS at 07:19

## 2023-05-08 RX ADMIN — SODIUM CITRATE AND CITRIC ACID MONOHYDRATE 30 ML: 500; 334 SOLUTION ORAL at 06:39

## 2023-05-08 RX ADMIN — PHENYLEPHRINE HYDROCHLORIDE 100 MCG: 10 INJECTION, SOLUTION INTRAVENOUS at 07:21

## 2023-05-08 RX ADMIN — KETOROLAC TROMETHAMINE 30 MG: 30 INJECTION, SOLUTION INTRAMUSCULAR at 08:08

## 2023-05-08 RX ADMIN — MISOPROSTOL 600 MCG: 200 TABLET ORAL at 09:35

## 2023-05-08 RX ADMIN — DOCUSATE SODIUM 100 MG: 100 CAPSULE, LIQUID FILLED ORAL at 20:52

## 2023-05-08 RX ADMIN — SODIUM CHLORIDE, POTASSIUM CHLORIDE, SODIUM LACTATE AND CALCIUM CHLORIDE 800 ML: 600; 310; 30; 20 INJECTION, SOLUTION INTRAVENOUS at 07:36

## 2023-05-08 RX ADMIN — OXYTOCIN 250 ML/HR: 10 INJECTION, SOLUTION INTRAMUSCULAR; INTRAVENOUS at 09:05

## 2023-05-08 RX ADMIN — BUPIVACAINE HYDROCHLORIDE 1.6 ML: 7.5 INJECTION, SOLUTION EPIDURAL; RETROBULBAR at 07:19

## 2023-05-08 RX ADMIN — ACETAMINOPHEN 1000 MG: 500 TABLET, FILM COATED ORAL at 05:28

## 2023-05-08 RX ADMIN — Medication 3 G: at 07:19

## 2023-05-08 RX ADMIN — SODIUM CHLORIDE, POTASSIUM CHLORIDE, SODIUM LACTATE AND CALCIUM CHLORIDE 200 ML: 600; 310; 30; 20 INJECTION, SOLUTION INTRAVENOUS at 08:11

## 2023-05-08 RX ADMIN — EPHEDRINE SULFATE 10 MG: 50 INJECTION INTRAVENOUS at 07:23

## 2023-05-08 RX ADMIN — OXYTOCIN-SODIUM CHLORIDE 0.9% IV SOLN 30 UNIT/500ML 650 ML/HR: 30-0.9/5 SOLUTION at 07:40

## 2023-05-08 RX ADMIN — Medication 125 MCG: at 07:19

## 2023-05-08 RX ADMIN — ACETAMINOPHEN 1000 MG: 500 TABLET, FILM COATED ORAL at 20:52

## 2023-05-08 RX ADMIN — ACETAMINOPHEN 1000 MG: 500 TABLET, FILM COATED ORAL at 15:41

## 2023-05-08 RX ADMIN — KETOROLAC TROMETHAMINE 30 MG: 30 INJECTION, SOLUTION INTRAMUSCULAR; INTRAVENOUS at 19:17

## 2023-05-08 RX ADMIN — Medication 1 TABLET: at 15:41

## 2023-05-08 RX ADMIN — PHENYLEPHRINE HYDROCHLORIDE 100 MCG: 10 INJECTION, SOLUTION INTRAVENOUS at 07:22

## 2023-05-08 RX ADMIN — METHYLERGONOVINE MALEATE 200 MCG: 0.2 INJECTION, SOLUTION INTRAMUSCULAR; INTRAVENOUS at 09:49

## 2023-05-08 RX ADMIN — SODIUM CHLORIDE, POTASSIUM CHLORIDE, SODIUM LACTATE AND CALCIUM CHLORIDE: 600; 310; 30; 20 INJECTION, SOLUTION INTRAVENOUS at 08:11

## 2023-05-08 NOTE — NON STRESS TEST
Ramesh Middleton, a  at 39w1d with an CHICHI of 2023, by Ultrasound, was seen at [facility] LABOR DELIVERY for a nonstress test.    Chief Complaint   Patient presents with   • Scheduled        Patient Active Problem List   Diagnosis   • Chlamydia infection during pregnancy   • Obesity affecting pregnancy in third trimester   • Previous  delivery affecting pregnancy   • Supervision of high risk pregnancy in third trimester   • Unwanted fertility   • Anemia during pregnancy in third trimester       Start Time: 050  Stop Time: 529    Interpretation A  Nonstress Test Interpretation A: Reactive  Comments A: Reviewed with MARSHAL NIX

## 2023-05-08 NOTE — ANESTHESIA PREPROCEDURE EVALUATION
Anesthesia Evaluation     NPO Solid Status: > 8 hours  NPO Liquid Status: > 2 hours           Airway   Mallampati: II  TM distance: >3 FB  Neck ROM: full  no difficulty expected  Dental - normal exam     Pulmonary - normal exam   Cardiovascular - normal exam        Neuro/Psych  GI/Hepatic/Renal/Endo    (+) morbid obesity,      Musculoskeletal     Abdominal    Substance History      OB/GYN    (+) Pregnant,         Other                        Anesthesia Plan    ASA 3     spinal       Anesthetic plan, risks, benefits, and alternatives have been provided, discussed and informed consent has been obtained with: patient.        CODE STATUS:

## 2023-05-08 NOTE — ANESTHESIA POSTPROCEDURE EVALUATION
Patient: Ramesh Middleton    Procedure Summary     Date: 23 Room / Location: [facility] LABOR DELIVERY  [facility] LABOR DELIVERY    Anesthesia Start: 709 Anesthesia Stop: 823    Procedure: Repeat  section and bilateral salpingectomy (Abdomen) Diagnosis:       Previous  delivery affecting pregnancy      (Previous  delivery affecting pregnancy [O34.219])    Surgeons: Samra Barger MD Provider: Jim Carias CRNA    Anesthesia Type: spinal ASA Status: 3          Anesthesia Type: spinal    Vitals  No vitals data found for the desired time range.          Post Anesthesia Care and Evaluation    Patient location during evaluation: PACU  Patient participation: complete - patient participated  Level of consciousness: awake and alert  Pain management: adequate    Airway patency: patent  Anesthetic complications: No anesthetic complications    Cardiovascular status: acceptable  Respiratory status: acceptable  Hydration status: acceptable    Comments: ---------------------------               23        ---------------------------   BP:          110/76         Pulse:         82          Resp:          18           Temp:   98.2 °F (36.8 °C)   SpO2:          98%         ---------------------------

## 2023-05-08 NOTE — L&D DELIVERY NOTE
[facility]   Delivery Note    Patient Name: Ramesh Middleton  : 2000  MRN: [id number]  Date of Delivery: 2023     Diagnosis     Pre & Post-Delivery:  Intrauterine pregnancy at 39w1d  Labor status:  Without Labor     Previous  delivery affecting pregnancy    Chlamydia infection during pregnancy    Obesity affecting pregnancy in third trimester    Supervision of high risk pregnancy in third trimester    Unwanted fertility    Anemia during pregnancy in third trimester    Status post  delivery    Status post bilateral salpingectomy             Problem List    Transfer to Postpartum     Review the Delivery Report for details.     Delivery     Delivery: , Low Transverse     YOB: 2022    Time of Birth:  Gestational Age 7:39 AM   39w1d     Anesthesia: Spinal     Delivering clinician: Samra Barger    Forceps?   No   Vacuum? No    Shoulder dystocia present: No        Delivery narrative:  See operative report      Infant     Findings: female  infant     Infant observations: Weight: 4139 g (9 lb 2 oz)   Length: 19.5  in  Observations/Comments:        Apgars: 8  @ 1 minute /    9  @ 5 minutes   Infant Name: Baby Girl     Placenta & Cord         Placenta delivered  Manual removal  at   2023  7:44 AM     Cord: 3 vessels  present.   Nuchal Cord?  no   Cord blood obtained: Yes    Cord gases obtained:  No    Cord gas results: Venous:  No results found for: PHCVEN    Arterial:  No results found for: PHCART     Repair      Episiotomy: Not recorded    No    Lacerations: No   Estimated Blood Loss:       Quantitative Blood Loss: Quantitative Blood Loss (mL): 454 mL (from OR) (2318)        Complications     none    Disposition     Mother to Mother Baby/Postpartum in stable condition currently.  Baby to remains with mom in stable condition currently.    Samra Barger MD  23  08:15 CDT

## 2023-05-08 NOTE — ANESTHESIA PROCEDURE NOTES
Spinal Block      Patient reassessed immediately prior to procedure    Patient location during procedure: OR  Start Time: 5/8/2023 7:12 AM  Stop Time: 5/8/2023 7:19 AM  Indication:at surgeon's request  Performed By  CRNA/LEONORA: Jim Carias CRNA  Preanesthetic Checklist  Completed: patient identified, IV checked, site marked, risks and benefits discussed, surgical consent, monitors and equipment checked, pre-op evaluation and timeout performed  Spinal Block Prep:  Patient Position:sitting  Sterile Tech:cap, gloves, gown, mask and sterile barriers  Prep:DuraPrep  Patient Monitoring:blood pressure monitoring, continuous pulse oximetry and EKG    Spinal Block Procedure  Approach:midline  Guidance:landmark technique and palpation technique  Location:L3-L4  Needle Type:Pencan  Needle Gauge:24 G  Placement of Spinal needle event:cerebrospinal fluid aspirated  Paresthesia: no  Fluid Appearance:clear  Medications: bupivacaine PF (MARCAINE) 0.75 % injection - Intrathecal   1.6 mL - 5/8/2023 7:19:00 AM   Post Assessment  Patient Tolerance:patient tolerated the procedure well with no apparent complications  Complications no

## 2023-05-08 NOTE — H&P
[facility]  HISTORY & PHYSICAL - Obstetrics    Name: Ramesh Middleton  MRN: [id number]  Location: [facility]  Date: 2023   CSN: [id number]      CHIEF COMPLAINT:  section    HISTORY OF PRESENT ILLNESS  Ramesh Middleton is a 22 y.o.  at 39w1d who is scheduled for a  section today at 39w1d.  Today denies LOF, vaginal bleeding, or contraction.  Reports good FM.    Patient denies any chest pain, palpitations, headaches, lightheadedness, shortness of breath, cough, nausea, vomiting, diarrhea, constipation, fever, or chills.    ROS  Review of Systems   Constitutional: Negative.    HENT: Negative.    Eyes: Negative.    Respiratory: Negative.    Cardiovascular: Negative.    Gastrointestinal: Negative.    Endocrine: Negative.    Genitourinary: Negative.    Musculoskeletal: Negative.    Skin: Negative.    Allergic/Immunologic: Negative.    Neurological: Negative.    Hematological: Negative.    Psychiatric/Behavioral: Negative.      PRENATAL LAB RESULTS  Prenatal labs reviewed.    External Prenatal Results     Pregnancy Outside Results - Transcribed From Office Records - See Scanned Records For Details     Test Value Date Time    ABO  O  23    Rh  Positive  23    Antibody Screen  Negative  23       Negative  22    Varicella IgG       Rubella  3.97 index 22    Hgb  10.4 g/dL 23       10.2 g/dL 23 1501       12.2 g/dL 10/23/22 1329       13.0 g/dL 22 09    Hct  31.2 % 2328       30.2 % 23 1501       34.2 % 10/23/22 1329       38.0 % 22 0930    Glucose Fasting GTT       Glucose Tolerance Test 1 hour       Glucose Tolerance Test 3 hour  69 mg/dL 20 0854    Gonorrhea (discrete)  Negative  22 0852       Negative  22    Chlamydia (discrete)  Negative  2252       Positive  22 09    RPR  Non-Reactive  22 09    VDRL        Syphilis Antibody       HBsAg  Non-Reactive  09/28/22 0930    Herpes Simplex Virus PCR       Herpes Simplex VIrus Culture       HIV  Non-Reactive  09/28/22 0930    Hep C RNA Quant PCR       Hep C Antibody  Non-Reactive  09/28/22 0930    AFP       Group B Strep  Negative  04/18/23 1346    GBS Susceptibility to Clindamycin       GBS Susceptibility to Erythromycin       Fetal Fibronectin       Genetic Testing, Maternal Blood             Drug Screening     Test Value Date Time    Urine Drug Screen       Amphetamine Screen  Negative  05/08/23 0528       Negative  09/28/22 0930    Barbiturate Screen  Negative  05/08/23 0528       Negative  09/28/22 0930    Benzodiazepine Screen  Negative  05/08/23 0528       Negative  09/28/22 0930    Methadone Screen  Negative  05/08/23 0528       Negative  09/28/22 0930    Phencyclidine Screen  Negative  05/08/23 0528       Negative  09/28/22 0930    Opiates Screen  Negative  05/08/23 0528       Negative  09/28/22 0930    THC Screen  Negative  05/08/23 0528       Negative  09/28/22 0930    Cocaine Screen       Propoxyphene Screen  Negative  05/08/23 0528       Negative  09/28/22 0930    Buprenorphine Screen  Negative  05/08/23 0528       Negative  09/28/22 0930    Methamphetamine Screen       Oxycodone Screen  Negative  05/08/23 0528       Negative  09/28/22 0930    Tricyclic Antidepressants Screen  Negative  05/08/23 0528       Negative  09/28/22 0930          Legend    ^: Historical                      PRENATAL RISK FACTORS  9/22 Problems (from 09/28/22 to present)     Problem Noted Resolved    Anemia during pregnancy in third trimester 3/16/2023 by Samra Barger MD No    Unwanted fertility 2/28/2023 by Samra Barger MD No    Overview Signed 2/28/2023  4:59 PM by Samra Barger MD     BTL MA consents signed 2/28         Supervision of high risk pregnancy in third trimester 12/5/2022 by Rosario Rowe APRN No    Previous   delivery affecting pregnancy 2021 by Evangelista Pimentel MD No    Overview Signed 1/3/2023  8:10 AM by Samra Barger MD     LTCS x2  Desires RLTCS         Obesity affecting pregnancy in third trimester 3/10/2020 by Rosario Rowe APRN No    Overview Signed 1/3/2023  8:11 AM by Samra Barger MD     Class II obesity, PG BMI 37.6         Chlamydia infection during pregnancy 12/10/2019 by Rosario Rowe, DIONICIO No    Overview Addendum 1/3/2023  8:12 AM by Samra Barger MD     Positive on ; MAO negative on              OB HISTORY  OB History    Para Term  AB Living   3 2 2     2   SAB IAB Ectopic Molar Multiple Live Births           0 2      # Outcome Date GA Lbr Miguel/2nd Weight Sex Delivery Anes PTL Lv   3 Current            2 Term 22 39w5d  3459 g (7 lb 10 oz) F CS-LTranv Spinal  ROSE   1 Term 20 39w6d  4260 g (9 lb 6.3 oz) M CS-LTranv EPI, Gen N ROSE      Complications: Failure to Progress in First Stage     PAST MEDICAL HISTORY  Past Medical History:   Diagnosis Date   • Anemia during pregnancy in third trimester 3/16/2023   • Chlamydia 2022   • History of chorioamnionitis      PAST SURGICAL HISTORY  Past Surgical History:   Procedure Laterality Date   •  SECTION N/A 2020    Procedure:  SECTION PRIMARY;  Surgeon: Evangelista Pimentel MD;  Location: [facility] LABOR DELIVERY;  Service: Obstetrics/Gynecology;  Laterality: N/A;   •  SECTION N/A 2021    Procedure:  SECTION REPEAT;  Surgeon: Evangelista Pimentel MD;  Location: [facility] LABOR DELIVERY;  Service: Obstetrics/Gynecology;  Laterality: N/A;     FAMILY HISTORY  Family History   Problem Relation Age of Onset   • Lupus Mother    • Cancer Mother    • Fibromyalgia Mother    • No Known Problems Brother    • Asthma Brother    • No Known Problems Daughter    • No Known Problems Son      SOCIAL HISTORY  Social History  "    Socioeconomic History   • Marital status: Single   Tobacco Use   • Smoking status: Former     Types: Electronic Cigarette   • Smokeless tobacco: Never   Vaping Use   • Vaping Use: Former   Substance and Sexual Activity   • Alcohol use: No   • Drug use: Not Currently     Types: Marijuana   • Sexual activity: Yes     Partners: Male     Birth control/protection: Condom     ALLERGIES  Allergies   Allergen Reactions   • Adhesive Tape Rash     HOME MEDICATIONS  Prior to Admission medications    Medication Sig Start Date End Date Taking? Authorizing Provider   ferrous sulfate 325 (65 FE) MG tablet Take 1 tablet by mouth 2 (Two) Times a Day. 3/16/23  Yes Samra Barger MD   folic acid (FOLVITE) 1 MG tablet Take 1 tablet by mouth Daily. 10/10/22  Yes Latricia Orozco APRN     PHYSICAL EXAM  /57 (BP Location: Right arm, Patient Position: Lying)   Pulse 100   Temp 98.2 °F (36.8 °C) (Oral)   Resp 18   Ht 170.2 cm (67\")   Wt 129 kg (284 lb)   LMP 2022 (Approximate)   SpO2 98%   Breastfeeding Yes   BMI 44.48 kg/m²   General: No acute distress.  Well developed, well nourished.  Pleasant.  Heart: Regular rate and rhythm.  No murmurs, rubs, or gallops.  Lungs: Clear to auscultation bilaterally.  No wheezes, rales, or rhonchi.  Abdomen: Soft, nontender to palpation, enlarged by gravid uterus.  Extremities: Mild edema noted bilaterally.    IMPRESSION  Ramesh Middleton is a 22 y.o.  scheduled for ERLTCS and B/L salpingectomy at 39w1d.    PLAN  1.  LTCS  - Admit: Labor and Delivery  - Attending: Dr. Samra Barger  - Condition: Stable  - Vitals: per protocol  - Activity: ad gardenia  - Nursing: NST prior to surgery  - Diet: NPO  - IV fluids:  mL/hr  - Allergies: Adhesive tape  - Labs: CBC, T&S, UDS  - GBS: negative.  Antibiotics not indicated.  - Ancef 2g prior to skin incision  - Patient consented for  section and bilateral salpingectomy.  Reviewed risks and benefits to " include injury to surrounding organs (bowel, bladder, ureters, blood vessels, nerves, baby), infection, bleeding (possibly requiring blood transfusion and/or hysterectomy), and maternal/fetal death.  After counseling, she would like to proceed with tubal ligation.  Dicussed recommendation for bilateral salpingectomy; reviewed that this is not a reversible procedure.  Reviewed that it is a risk-reducing procedure for ovarian and fallopian tube cancers in the future.  Discussed that if she did decide that she wanted another child would be through IVF.  Voices understanding.  - Ramesh Middleton and MICHAEL have discussed pain goals for this hospitalization after reviewing her current clinical condition, medical history and prior pain experiences.  The goal is to keep her pain level appropriate.  To help achieve this, schedule Tylenol and NSAIDS, +/- Duramorph or PCA.    This document has been electronically signed by Samra Barger MD on May 8, 2023 07:02 CDT.

## 2023-05-08 NOTE — OP NOTE
Operative Report    Name: Ramesh Middleton  MRN: [id number]  Date: 2023  CSN: [id number]      Location: [facility] LABOR DELIVERY    Service: Obstetrics    Pre-op Diagnosis:   1.  IUP at 39w1d   2.  Prior  section x2  3.  Class II obesity, PG BMI 37.6  4.  Chlamydia  5.  Anemia of pregnancy  6.  Completed family status, desires permanent sterilization    Post-op Diagnosis: Same    Surgeon: Samra Barger MD FACOG    Assistant: Mercedes Anthony CST CSFA    Staff:  Circulator: Val Amin RN  Scrub Person: Mercedes Anthony CSA; Toshia Epps  L & D Nurse: Meagan John RN; Iva Márquez RN    Anesthesia: Spinal w/ Duramorph    Anesthesia Staff:  CRNA: Jim Carias CRNA    Operation: Elective repeat low-transverse  section and bilateral salpingectomy    Drains: Vazquez catheter, draining clear yellow urine    Complications: None    Findings: Obese abdomen.  Fascia moderately scarred.  Bladder scarred to lower uterine segment, easily mobilized.  Delivery of viable female  weighing 4139 grams with Apgars of 8 and 9.  Bilateral fallopian tubes and ovaries normal.    Condition: Stable    Specimens/Disposition: Placenta and umbilical cord, bilateral fallopian tubes- to pathology    Estimated Blood Loss: 750 mL  Quantitative Blood Loss: pending  IV Fluids: 1700 mL crystalloid  Urine Output: 125 mL    Indications: Ramesh Middleton, 22 y.o.,  at 39w1d presenting for repeat  section; declined TOLAC.  Desired permanent sterilization.    Description of Operation:  The patient was identified and the procedure verified as a  section delivery.  The patient was given spinal anesthesia.  Patient prepared and draped in normal sterile fashion in dorsal supine position with a leftward tilt.  A transverse skin incision was made with the scalpel and carried down through the subcutaneous tissue to the fascia using the  Bovie.  Fascial incision was made with the Bovie and extended transversely with Garcia scissors.  The superior aspect of the fascia was grasped with Kocher clamps and the rectus muscle was dissected off bluntly and sharply with Bovie.  The inferior aspect of the fascia was then grasped with Kocher clamps and the rectus muscle and pyramidalis were dissected off bluntly and then sharply with Bovie.  The rectus muscle was then  in the midline and the peritoneum was identified and entered bluntly.  The peritoneal incision was extended transversely.  Bladder blade and retractor were inserted.  The uterovesical peritoneal reflection was identified and incised transversely with Metzenbaum scissors.  The bladder flap was bluntly freed from the lower uterine segment. The bladder blade was adjusted.  A low transverse uterine incision was made with a scalpel and the uterine incision was extended with upward traction.  The amniotic sac was ruptured at time of hysterotomy as the lower uterine segment was somewhat thin.  Delivered from cephalic presentation was a live female  weighing 4139 grams with Apgar scores of 8 at one minute and 9 at five minutes.  Cord clamped and cut and infant with bulb suction were handed to awaiting staff.  Cord blood was obtained and sent.  The placenta was removed intact and appeared normal.  Uterus exteriorized and cleared of all clots and debris.  The uterus, tubes and ovaries appeared normal.  The uterine incision was closed with running locked sutures of 0-Vicryl and imbricated with 0-Vicryl.  Attention was then turned to the right fallopian tube which was followed out to the fimbria.  The Enseal was used to excise the fallopian tube.  The same procedure was done to the left fallopian tube, which was also completely excised.  Good hemostasis noted from bilateral mesosalpinx.  Posterior cul-de-sac was cleared.  Uterus was reinserted atraumatically.  Hemostasis was observed.   Bilateral paracolic gutters cleared.  Prior to closure of the abdomen, bilateral transversus abdominis planus block was performed with 15 mL of 0.5% ropivacaine on each side under direct visualization.  Inspection of the abdomen and pelvis prior to abdominal wall closure revealed no evidence of retained instruments or sponges.  The fascia was then reapproximated with running sutures of 0-Vicryl.  Subcutaneous layer closed with 2-0 plain gut.  The skin was reapproximated with 3-0 Monocryl in subcuticular fashion.  Exofin dressing applied.  There were no intraoperative complications and patient tolerated the procedure well.  The patient was escorted to her room in stable condition.    The patient received Ancef 2g for antibiotic prophylaxis prior to the start of the procedure.    Mercedes Anthony CST CSFA was responsible for performing the following activities: Retraction, Suction, Suturing, Closing, Placing Dressing and Delivery of Fetus and their skilled assistance was necessary for the success of this case.    This document has been electronically signed by Samra Barger MD on May 8, 2023 08:11 CDT.

## 2023-05-08 NOTE — PLAN OF CARE
Problem: Adult Inpatient Plan of Care  Goal: Plan of Care Review  Outcome: Ongoing, Progressing  Flowsheets (Taken 5/8/2023 7746)  Progress: improving  Plan of Care Reviewed With: patient  Outcome Evaluation: VSS, fundus firm, lochia light, stood at bedside.   Goal Outcome Evaluation:  Plan of Care Reviewed With: patient        Progress: improving  Outcome Evaluation: VSS, fundus firm, lochia light, stood at bedside.

## 2023-05-08 NOTE — LACTATION NOTE
Lactation Consult Note    Reviewed breastfeeding education in booklet.  Educated mother to continue to offer the breast with feeding cues and reviewed calling out before feedings so staff can monitor infant blood glucose per the protocol r/t LGA.  Reviewed s/s of a good latch and what to expect on Day 1 with breastfeeding.     Evaluation Completed: 2023 13:51 CDT  Patient Name: Ramesh Middleton  :  2000  MRN:  [id number]     REFERRAL  INFORMATION:                          Date of Referral: 23   Person Making Referral: lactation consultant  Maternal Reason for Referral: breastfeeding currently       DELIVERY HISTORY:  Infant First Feeding: breastfeeding      Skin to skin initiation date/time:      Skin to skin end date/time:           MATERNAL ASSESSMENT:  Breast Size Issue: none (23 133)  Breast Shape: Bilateral:, pendulous (23 133)  Breast Density: Bilateral:, soft (23)  Areola: Bilateral:, elastic (23)  Nipples: Bilateral:, graspable, everted (23)     Left Nipple Symptoms: intact, nontender (23 133)  Right Nipple Symptoms: intact, nontender (23 133)       INFANT ASSESSMENT:  Information for the patient's :  Radha Middleton [[id number]]   No past medical history on file.     Feeding Readiness Cues: energy for feeding, rooting (23 133)         Feeding Physical Stress Cues: color unchanged, respirations unchanged (23 133)               Nutrition Interventions: lactation consult initiated (23)               Infant Positioning: cradle (23 133)         Effective Latch During Feeding: yes (23)      Signs of Milk Transfer: audible swallow, deep jaw excursions noted (23)       Latch: 1-->repeated attempts, holds nipple in mouth, stimulate to suck (23)   Audible Swallowin-->a few with stimulation (23)   Type of Nipple: 2-->everted (after  stimulation) (05/08/23 1330)   Comfort (Breast/Nipple): 2-->soft/nontender (05/08/23 1330)   Hold (Positioning): 2-->no assist from staff, mother able to position/hold infant (05/08/23 1330)   Latch Score: 8 (05/08/23 1330)                    MATERNAL INFANT FEEDING:     Maternal Emotional State: independent (05/08/23 1330)  Infant Positioning: cradle (05/08/23 1330)   Signs of Milk Transfer: audible swallow, deep jaw excursions noted (05/08/23 1330)  Pain with Feeding: no (05/08/23 1330)           Milk Ejection Reflex: absent with colostrum (05/08/23 1330)           Latch Assistance: none needed (05/08/23 1330)

## 2023-05-09 VITALS
WEIGHT: 284 LBS | DIASTOLIC BLOOD PRESSURE: 53 MMHG | RESPIRATION RATE: 18 BRPM | BODY MASS INDEX: 44.57 KG/M2 | TEMPERATURE: 97.9 F | HEIGHT: 67 IN | HEART RATE: 78 BPM | SYSTOLIC BLOOD PRESSURE: 107 MMHG | OXYGEN SATURATION: 97 %

## 2023-05-09 LAB
HCT VFR BLD AUTO: 23.6 % (ref 34–46.6)
HGB BLD-MCNC: 8 G/DL (ref 12–15.9)

## 2023-05-09 PROCEDURE — 0503F POSTPARTUM CARE VISIT: CPT | Performed by: OBSTETRICS & GYNECOLOGY

## 2023-05-09 PROCEDURE — 25010000002 KETOROLAC TROMETHAMINE PER 15 MG: Performed by: OBSTETRICS & GYNECOLOGY

## 2023-05-09 PROCEDURE — 85018 HEMOGLOBIN: CPT | Performed by: OBSTETRICS & GYNECOLOGY

## 2023-05-09 PROCEDURE — 85014 HEMATOCRIT: CPT | Performed by: OBSTETRICS & GYNECOLOGY

## 2023-05-09 RX ORDER — ACETAMINOPHEN 500 MG
1000 TABLET ORAL EVERY 6 HOURS PRN
Qty: 30 TABLET | Refills: 1 | Status: SHIPPED | OUTPATIENT
Start: 2023-05-09 | End: 2023-05-16

## 2023-05-09 RX ORDER — OXYCODONE HYDROCHLORIDE 5 MG/1
5 TABLET ORAL EVERY 4 HOURS PRN
Qty: 10 TABLET | Refills: 0 | Status: SHIPPED | OUTPATIENT
Start: 2023-05-09 | End: 2023-05-16

## 2023-05-09 RX ORDER — IBUPROFEN 600 MG/1
600 TABLET ORAL EVERY 6 HOURS PRN
Qty: 30 TABLET | Refills: 1 | Status: SHIPPED | OUTPATIENT
Start: 2023-05-09 | End: 2023-05-16

## 2023-05-09 RX ORDER — PSEUDOEPHEDRINE HCL 30 MG
100 TABLET ORAL 2 TIMES DAILY
Qty: 30 CAPSULE | Refills: 0 | Status: SHIPPED | OUTPATIENT
Start: 2023-05-09

## 2023-05-09 RX ADMIN — ACETAMINOPHEN 1000 MG: 500 TABLET, FILM COATED ORAL at 04:49

## 2023-05-09 RX ADMIN — KETOROLAC TROMETHAMINE 30 MG: 30 INJECTION, SOLUTION INTRAMUSCULAR; INTRAVENOUS at 06:38

## 2023-05-09 RX ADMIN — KETOROLAC TROMETHAMINE 30 MG: 30 INJECTION, SOLUTION INTRAMUSCULAR; INTRAVENOUS at 00:07

## 2023-05-09 RX ADMIN — DOCUSATE SODIUM 100 MG: 100 CAPSULE, LIQUID FILLED ORAL at 10:23

## 2023-05-09 RX ADMIN — ACETAMINOPHEN 1000 MG: 500 TABLET, FILM COATED ORAL at 10:24

## 2023-05-09 RX ADMIN — POLYETHYLENE GLYCOL 3350 17 G: 17 POWDER, FOR SOLUTION ORAL at 10:24

## 2023-05-09 RX ADMIN — SIMETHICONE 80 MG: 80 TABLET, CHEWABLE ORAL at 13:46

## 2023-05-09 RX ADMIN — IBUPROFEN 600 MG: 600 TABLET, FILM COATED ORAL at 13:46

## 2023-05-09 RX ADMIN — SIMETHICONE 80 MG: 80 TABLET, CHEWABLE ORAL at 10:24

## 2023-05-09 RX ADMIN — Medication 1 TABLET: at 10:23

## 2023-05-09 NOTE — PLAN OF CARE
Problem: Adult Inpatient Plan of Care  Goal: Plan of Care Review  Outcome: Ongoing, Progressing  Flowsheets (Taken 5/9/2023 0455)  Progress: improving  Plan of Care Reviewed With:   patient   significant other  Outcome Evaluation: VSS, FF, lochia light, pain controlled with scheduled medications, pt took shower, breastfeeding baby  Goal: Patient-Specific Goal (Individualized)  Outcome: Ongoing, Progressing  Goal: Absence of Hospital-Acquired Illness or Injury  Outcome: Ongoing, Progressing  Intervention: Identify and Manage Fall Risk  Recent Flowsheet Documentation  Taken 5/9/2023 0112 by Yesi Ruano RN  Safety Promotion/Fall Prevention: safety round/check completed  Taken 5/9/2023 0006 by Yesi Ruano RN  Safety Promotion/Fall Prevention: safety round/check completed  Taken 5/8/2023 2318 by Yesi Ruano RN  Safety Promotion/Fall Prevention: safety round/check completed  Taken 5/8/2023 2230 by Yesi Ruano RN  Safety Promotion/Fall Prevention: safety round/check completed  Taken 5/8/2023 2106 by Yesi Ruano RN  Safety Promotion/Fall Prevention: safety round/check completed  Taken 5/8/2023 2045 by Yesi Ruano RN  Safety Promotion/Fall Prevention: safety round/check completed  Taken 5/8/2023 1902 by Yesi Ruano RN  Safety Promotion/Fall Prevention: safety round/check completed  Intervention: Prevent Skin Injury  Recent Flowsheet Documentation  Taken 5/8/2023 2106 by Yesi Ruano RN  Body Position: position changed independently  Intervention: Prevent and Manage VTE (Venous Thromboembolism) Risk  Recent Flowsheet Documentation  Taken 5/8/2023 2106 by Yesi Ruano RN  VTE Prevention/Management: (patient ambulating in room)   sequential compression devices off   patient refused intervention  Intervention: Prevent Infection  Recent Flowsheet Documentation  Taken 5/8/2023 2106 by Yesi Ruano RN  Infection Prevention:   cohorting utilized   equipment surfaces  disinfected   hand hygiene promoted   personal protective equipment utilized   rest/sleep promoted  Goal: Optimal Comfort and Wellbeing  Outcome: Ongoing, Progressing  Goal: Readiness for Transition of Care  Outcome: Ongoing, Progressing     Problem: Bleeding ( Delivery)  Goal: Bleeding is Controlled  Outcome: Ongoing, Progressing     Problem: Change in Fetal Wellbeing ( Delivery)  Goal: Stable Fetal Wellbeing  Outcome: Ongoing, Progressing  Intervention: Promote and Monitor Fetal Wellbeing  Recent Flowsheet Documentation  Taken 2023 by Yesi Ruano RN  Body Position: position changed independently     Problem: Infection ( Delivery)  Goal: Absence of Infection Signs and Symptoms  Outcome: Ongoing, Progressing  Intervention: Minimize Infection Risk  Recent Flowsheet Documentation  Taken 2023 by Yesi Ruano RN  Infection Prevention:   cohorting utilized   equipment surfaces disinfected   hand hygiene promoted   personal protective equipment utilized   rest/sleep promoted     Problem: Respiratory Compromise ( Delivery)  Goal: Effective Oxygenation and Ventilation  Outcome: Ongoing, Progressing     Problem: Adjustment to Role Transition (Postpartum  Delivery)  Goal: Successful Maternal Role Transition  Outcome: Ongoing, Progressing     Problem: Bleeding (Postpartum  Delivery)  Goal: Hemostasis  Outcome: Ongoing, Progressing     Problem: Infection (Postpartum  Delivery)  Goal: Absence of Infection Signs and Symptoms  Outcome: Ongoing, Progressing     Problem: Pain (Postpartum  Delivery)  Goal: Acceptable Pain Control  Outcome: Ongoing, Progressing     Problem: Postoperative Nausea and Vomiting (Postpartum  Delivery)  Goal: Nausea and Vomiting Relief  Outcome: Ongoing, Progressing     Problem: Postoperative Urinary Retention (Postpartum  Delivery)  Goal: Effective Urinary Elimination  Outcome: Ongoing,  Progressing     Problem: Fall Injury Risk  Goal: Absence of Fall and Fall-Related Injury  Outcome: Ongoing, Progressing  Intervention: Identify and Manage Contributors  Recent Flowsheet Documentation  Taken 5/8/2023 2106 by Yesi Ruano RN  Medication Review/Management: medications reviewed  Intervention: Promote Injury-Free Environment  Recent Flowsheet Documentation  Taken 5/9/2023 0112 by Yesi Ruano RN  Safety Promotion/Fall Prevention: safety round/check completed  Taken 5/9/2023 0006 by Yesi Ruano RN  Safety Promotion/Fall Prevention: safety round/check completed  Taken 5/8/2023 2318 by Yesi Ruano RN  Safety Promotion/Fall Prevention: safety round/check completed  Taken 5/8/2023 2230 by Yesi Ruano RN  Safety Promotion/Fall Prevention: safety round/check completed  Taken 5/8/2023 2106 by Yesi Ruano RN  Safety Promotion/Fall Prevention: safety round/check completed  Taken 5/8/2023 2045 by Yesi Ruano RN  Safety Promotion/Fall Prevention: safety round/check completed  Taken 5/8/2023 1902 by Yesi Ruano RN  Safety Promotion/Fall Prevention: safety round/check completed   Goal Outcome Evaluation:  Plan of Care Reviewed With: patient, significant other        Progress: improving  Outcome Evaluation: VSS, FF, lochia light, pain controlled with scheduled medications, pt took shower, breastfeeding baby

## 2023-05-09 NOTE — DISCHARGE SUMMARY
[facility]  Discharge Summary  Patient Name: Ramesh Middleton  : 2000  MRN: [id number]  [facility]: [id number]    Discharge Summary    Date of Admission: 2023   Date of Discharge: 2023    Principle Discharge Dx: Active Hospital Problems    Diagnosis  POA   • **Previous  delivery affecting pregnancy [O34.219]  Yes     LTCS x2  Desires RLTCS     • Status post  delivery [Z98.891]  Not Applicable   • Status post bilateral salpingectomy [Z90.79]  Not Applicable   • Anemia during pregnancy in third trimester [O99.013]  Yes   • Unwanted fertility [Z30.09]  Yes     BTL MA consents signed      • Supervision of high risk pregnancy in third trimester [O09.93]  Not Applicable   • Obesity affecting pregnancy in third trimester [O99.213]  Yes     Class II obesity, PG BMI 37.6     • Chlamydia infection during pregnancy [O98.819, A74.9]  Yes     Positive on ; MAO negative on         Procedures Performed: Procedure(s):  Repeat  section and bilateral salpingectomy   Brief History: Patient is a 22 y.o. now  who presented to labor and delivery for delivery.   Hospital Course: Patient presented at 39w1d for delivery.  She had a RLTCS and B/L salpingectomy.  Her postoperative course was unremarkable.  On POD #1 she expressed the desire for discharge.  She had passed gas and was urinating normally.  She was eating a regular diet without difficulty.  She was ambulating well.  Her incision was clean, dry and intact.  Discharge instructions were given.  All questions were answered   Condition:  Discharge Activity: Stable  Activity Instructions     Bathing Restrictions      Type of Restriction: Bathing    Bathing Restrictions: Other    Explain Bathing Restrictions: No soaking in bathtub for 4 weeks/ Showers are fine.    Driving Restrictions      Type of Restriction: Driving    Driving Restrictions: No Driving (Time Limited)    Length: Other    Indicate  Length of Restriction: No driving for 1 week or while on narcotic pain medications. You must be able to quickly press on the brake before driving. Riding is car is fine.    Lifting Restrictions      Type of Restriction: Lifting    Lifting Restrictions: Other    Explain Lifing Restrictions: No lifting more than infant and baby carrier together for 6 weeks.    Pelvic Rest      Nothing in the vagina for 6 weeks to include tampons, douching, or sexual intercourse.    Sexual Activity Restrictions      Type of Restriction: Sex    Explain Sexual Activity Restrictions: No sexual intercourse, tampon use, or douching for at least 6 weeks         Discharge Diet: Diet Instructions     Diet: Regular/House Diet      Discharge Diet: Regular/House Diet    Texture: Regular Texture (IDDSI 7)    Fluid Consistency: Thin (IDDSI 0)         Discharge Medications:    Your medication list      START taking these medications      Instructions Last Dose Given Next Dose Due   acetaminophen 500 MG tablet  Commonly known as: TYLENOL      Take 2 tablets by mouth Every 6 (Six) Hours As Needed for Mild Pain.       docusate sodium 100 MG capsule      Take 1 capsule by mouth 2 (Two) Times a Day.       ibuprofen 600 MG tablet  Commonly known as: ADVIL,MOTRIN      Take 1 tablet by mouth Every 6 (Six) Hours As Needed for Mild Pain.       oxyCODONE 5 MG immediate release tablet  Commonly known as: ROXICODONE      Take 1 tablet by mouth Every 4 (Four) Hours As Needed for Severe Pain for up to 6 days.          CONTINUE taking these medications      Instructions Last Dose Given Next Dose Due   ferrous sulfate 325 (65 FE) MG tablet      Take 1 tablet by mouth 2 (Two) Times a Day.       folic acid 1 MG tablet  Commonly known as: FOLVITE      Take 1 tablet by mouth Daily.             Where to Get Your Medications      These medications were sent to [id number] - [place] [id number] [facility] AT [facility] [phone]  -  [phone] [address] [address]    Phone: [phone] ·   acetaminophen 500 MG tablet  · docusate sodium 100 MG capsule  · ibuprofen 600 MG tablet  · oxyCODONE 5 MG immediate release tablet        Discharge Disposition: Home   Follow-up: Future Appointments   Date Time Provider Department Center   5/16/2023  3:15 PM Samra Barger MD [facility] None   6/13/2023  1:30 PM Thalia De La Cruz APRN [facility] None      <30 minutes spent with the patient.    This document has been electronically signed by Samra Barger MD on May 9, 2023 12:21 CDT.

## 2023-05-09 NOTE — LACTATION NOTE
This note was copied from a baby's chart.  After breastfeeding assistance this morning mother called out requesting a bottle.  Further latching assistance offered from staff this afternoon for lactation nurse to help.  Mother declined wanting further assistance and states she just wants to formula feed while in the hospital per primary RN report.  Primary nurse states she will pump when she gets home and encouraged mother to pump every 2-3 hours to help stimulate milk production. Mother declined needing assistance with pumping this morning and states she has a pump. Contact information provided if needing help or has any questions later.

## 2023-05-10 LAB — REF LAB TEST METHOD: NORMAL

## 2023-05-15 ENCOUNTER — TELEPHONE (OUTPATIENT)
Dept: LACTATION | Facility: HOSPITAL | Age: 23
End: 2023-05-15
Payer: COMMERCIAL

## 2023-05-16 ENCOUNTER — POSTPARTUM VISIT (OUTPATIENT)
Dept: OBSTETRICS AND GYNECOLOGY | Facility: CLINIC | Age: 23
End: 2023-05-16
Payer: COMMERCIAL

## 2023-05-16 VITALS
WEIGHT: 268 LBS | BODY MASS INDEX: 42.06 KG/M2 | HEIGHT: 67 IN | SYSTOLIC BLOOD PRESSURE: 144 MMHG | DIASTOLIC BLOOD PRESSURE: 82 MMHG

## 2023-05-16 NOTE — PROGRESS NOTES
"[facility]  Obstetrics  Date of Service: 2023    CC: Postpartum visit      Ramesh Middleton is a 22 y.o.  s/p , Low Transverse and B/L salpingectomy on 2023 at 39w1d who presents today for postpartum check.  The patient states she is doing well.  Patient denies postpartum depression.  Menstrual cycles have not resumed.  Breastfeeding.  She has not resumed sexual intercourse.  Denies bowel or bladder issues.  Dressing came off 2 days ago.    PHYSICAL EXAM:    /82 (BP Location: Left arm, Patient Position: Sitting, Cuff Size: Adult)   Ht 170.2 cm (67\")   Wt 122 kg (268 lb)   LMP 2022 (Approximate)   Breastfeeding Yes   BMI 41.97 kg/m²   Abdomen: +BS, benign, no masses, soft, non-tender.  Incision: Well-healed, clean, dry, intact.  Slight irritation surrounding incision from tape.  Extremities: No deep calf tenderness.  Postpartum Depression Screening Questionnaire:1    IMPRESSION/PLAN: Ramesh Middleton is a 22 y.o.  s/p , Low Transverse and B/L salpingectomy on 2023.  Doing well.  - Recovered nicely from her delivery  - Contraception: BTL  - Continue restrictions  - RTC 5 weeks for final PP visit w/ pap smear    This document has been electronically signed by Samra Barger MD on May 16, 2023 15:07 CDT.  "

## 2025-08-01 NOTE — DISCHARGE INSTRUCTIONS
Please return for any new or worsening symptoms, or any concerns.   Normal volume, rate, productivity, spontaneity and articulation

## (undated) DEVICE — SYS CLS SKIN PREMIERPRO EXOFINFUSION 22CM

## (undated) DEVICE — STERILE POLYISOPRENE POWDER-FREE SURGICAL GLOVES WITH EMOLLIENT COATING: Brand: PROTEXIS

## (undated) DEVICE — SUT MNCRYL 3/0 Y936H

## (undated) DEVICE — PK C/SECT 60

## (undated) DEVICE — SUT PROLN 1 CT 30IN 8435H

## (undated) DEVICE — SUT  GUT PLAIN 2/0 CT1 27IN 843H

## (undated) DEVICE — GLV SURG MICROTOUCH LTX SZ7 STRL

## (undated) DEVICE — PAD,ABDOMINAL,8"X10",ST,LF: Brand: MEDLINE

## (undated) DEVICE — SUT GUT CHRM 2/0 SH 27IN G123H

## (undated) DEVICE — SUT GUT CHRM 1 CTX 36IN 905H

## (undated) DEVICE — SUT VIC 0 CTX 36IN J978H

## (undated) DEVICE — TRY SPINE PENCAN 24GA X4IN

## (undated) DEVICE — GLV SURG NEOLON 2G PF LF 6.5 STRL

## (undated) DEVICE — SUT MONOCRYL 4/0 PS2 27IN Y426H ETY426H

## (undated) DEVICE — NANOLINECORNERSTONESPROXIMAL END: ISO 80369-7: Brand: SONOPLEX

## (undated) DEVICE — SUT VIC PLS 0 CTX 36IN UD VCP978H

## (undated) DEVICE — UNDYED BRAIDED (POLYGLACTIN 910), SYNTHETIC ABSORBABLE SUTURE: Brand: COATED VICRYL

## (undated) DEVICE — DRP SURG UNIV BASIC BILAMINATE 53X77IN DISP

## (undated) DEVICE — GLV SURG SENSICARE PI LF PF 7.5

## (undated) DEVICE — PATIENT RETURN ELECTRODE, SINGLE-USE, CONTACT QUALITY MONITORING, ADULT, WITH 9FT CORD, FOR PATIENTS WEIGING OVER 33LBS. (15KG): Brand: MEGADYNE

## (undated) DEVICE — PCH SURG STRL INST SLF/SEAL 7.5X13IN

## (undated) DEVICE — GARMENT,MEDLINE,DVT,INT,CALF,MED, GEN2: Brand: MEDLINE

## (undated) DEVICE — SUT VIC 2/0 CT1 36IN

## (undated) DEVICE — CONTAINER,SPECIMEN,OR STERILE,4OZ: Brand: MEDLINE

## (undated) DEVICE — PENCL ES MEGADINE EZ/CLEAN BUTN W/HOLSTR 10FT

## (undated) DEVICE — ADHS LIQ MASTISOL 2/3ML

## (undated) DEVICE — GLV SURG SIGNATURE ESSENTIAL PF LTX SZ7

## (undated) DEVICE — SPNG LAP 18X18IN LF STRL PK/5

## (undated) DEVICE — 3M™ STERI-STRIP™ REINFORCED ADHESIVE SKIN CLOSURES, R1547, 1/2 IN X 4 IN (12 MM X 100 MM), 6 STRIPS/ENVELOPE: Brand: 3M™ STERI-STRIP™

## (undated) DEVICE — DRSNG TELFA PAD NONADH STR 1S 3X8IN

## (undated) DEVICE — GLV SURG SIGNATURE ESSENTIAL PF LTX SZ6.5

## (undated) DEVICE — ENSEAL TISSUE SEALER G2 CURVED JAW FOR USE WITH G2 GENERATOR 5MM DIAMETER 14CM SHAFT LENGTH: Brand: ENSEAL

## (undated) DEVICE — SUT VIC 0 CT1 36IN J946H